# Patient Record
Sex: FEMALE | Race: ASIAN | NOT HISPANIC OR LATINO | Employment: FULL TIME | URBAN - METROPOLITAN AREA
[De-identification: names, ages, dates, MRNs, and addresses within clinical notes are randomized per-mention and may not be internally consistent; named-entity substitution may affect disease eponyms.]

---

## 2023-03-09 LAB — CHORIOGONADOTROPIN (MIU/ML) IN SER/PLAS: 98 IU/L

## 2023-03-13 LAB — CHORIOGONADOTROPIN (MIU/ML) IN SER/PLAS: 22 IU/L

## 2023-04-05 LAB
ABO GROUP (TYPE) IN BLOOD: NORMAL
ANTIBODY SCREEN: NORMAL
ANTICARDIOLIPIN IGA ANTIBODY: 0.9 APL U/ML (ref 0–20)
ANTICARDIOLIPIN IGG ANTIBODY: <1.6 GPL U/ML (ref 0–20)
ANTICARDIOLIPIN IGM ANTIBODY: 1.1 MPL U/ML (ref 0–20)
BETA 2 GLYCOPROTEIN 1 IGA AB IN SERUM: <0.6 U/ML (ref 0–20)
BETA 2 GLYCOPROTEIN 1 IGG AB IN SERUM: <1.4 U/ML (ref 0–20)
BETA 2 GLYCOPROTEIN 1 IGM ANTIBODY IN SERUM: 1 U/ML (ref 0–20)
ERYTHROCYTE DISTRIBUTION WIDTH (RATIO) BY AUTOMATED COUNT: 13.3 % (ref 11.5–14.5)
ERYTHROCYTE MEAN CORPUSCULAR HEMOGLOBIN CONCENTRATION (G/DL) BY AUTOMATED: 33.5 G/DL (ref 32–36)
ERYTHROCYTE MEAN CORPUSCULAR VOLUME (FL) BY AUTOMATED COUNT: 90 FL (ref 80–100)
ERYTHROCYTES (10*6/UL) IN BLOOD BY AUTOMATED COUNT: 5.15 X10E12/L (ref 4–5.2)
ESTIMATED AVERAGE GLUCOSE FOR HBA1C: 91 MG/DL
HEMATOCRIT (%) IN BLOOD BY AUTOMATED COUNT: 46.3 % (ref 36–46)
HEMOGLOBIN (G/DL) IN BLOOD: 15.5 G/DL (ref 12–16)
HEMOGLOBIN A1C/HEMOGLOBIN TOTAL IN BLOOD: 4.8 %
HEPATITIS B VIRUS SURFACE AG PRESENCE IN SERUM: NONREACTIVE
HEPATITIS C VIRUS AB PRESENCE IN SERUM: NONREACTIVE
HIV 1/ 2 AG/AB SCREEN: NONREACTIVE
LEUKOCYTES (10*3/UL) IN BLOOD BY AUTOMATED COUNT: 5 X10E9/L (ref 4.4–11.3)
NRBC (PER 100 WBCS) BY AUTOMATED COUNT: 0 /100 WBC (ref 0–0)
PLATELETS (10*3/UL) IN BLOOD AUTOMATED COUNT: 219 X10E9/L (ref 150–450)
PROLACTIN (UG/L) IN SER/PLAS: 7.4 UG/L (ref 3–20)
RH FACTOR: NORMAL
RUBELLA VIRUS IGG AB: POSITIVE
SYPHILIS TOTAL AB: NONREACTIVE
THYROTROPIN (MIU/L) IN SER/PLAS BY DETECTION LIMIT <= 0.05 MIU/L: 1.72 MIU/L (ref 0.44–3.98)
VARICELLA ZOSTER IGG: POSITIVE

## 2023-04-06 LAB
CHLAMYDIA TRACH., AMPLIFIED: NEGATIVE
DILUTE RUSSELL VIPER VENOM TIME CONF: 0.97 RATIO
DILUTE RUSSELL VIPER VENOM TIME SCREEN: 0.91 RATIO
DILUTE RUSSELL VIPER VENOM TIME TEST RATIO: 0.93 RATIO
LUPUS ANTICOAGULANT INTERPRETATION: NORMAL
N. GONORRHEA, AMPLIFIED: NEGATIVE
NORMALIZED SILICA CLOTTING TIME (RATIO) OF PPP: 0.83 RATIO
SILICA CLOTTING TIME CONFIRMATION: 1.32 RATIO
SILICA CLOTTING TIME SCREEN: 1.1 RATIO

## 2023-04-08 LAB — LIVER/KIDNEY MICROSOME TYPE 1 ANTIBODIES: NORMAL

## 2023-05-13 LAB — ANTI-MULLERIAN HORMONE (AMH): 10.9 NG/ML (ref 0.18–11.71)

## 2023-11-15 ENCOUNTER — APPOINTMENT (OUTPATIENT)
Dept: OBSTETRICS AND GYNECOLOGY | Facility: CLINIC | Age: 33
End: 2023-11-15
Payer: COMMERCIAL

## 2023-11-15 ENCOUNTER — LAB (OUTPATIENT)
Dept: LAB | Facility: LAB | Age: 33
End: 2023-11-15
Payer: COMMERCIAL

## 2023-11-15 ENCOUNTER — OFFICE VISIT (OUTPATIENT)
Dept: OBSTETRICS AND GYNECOLOGY | Facility: CLINIC | Age: 33
End: 2023-11-15
Payer: COMMERCIAL

## 2023-11-15 VITALS
WEIGHT: 146 LBS | DIASTOLIC BLOOD PRESSURE: 82 MMHG | HEIGHT: 61 IN | BODY MASS INDEX: 27.56 KG/M2 | SYSTOLIC BLOOD PRESSURE: 132 MMHG

## 2023-11-15 DIAGNOSIS — Z3A.11 11 WEEKS GESTATION OF PREGNANCY (HHS-HCC): ICD-10-CM

## 2023-11-15 DIAGNOSIS — O36.80X0 PREGNANCY WITH INCONCLUSIVE FETAL VIABILITY (HHS-HCC): Primary | ICD-10-CM

## 2023-11-15 DIAGNOSIS — O26.21 RECURRENT PREGNANCY LOSS IN PREGNANT PATIENT IN FIRST TRIMESTER, ANTEPARTUM (HHS-HCC): ICD-10-CM

## 2023-11-15 DIAGNOSIS — O36.80X0 PREGNANCY WITH INCONCLUSIVE FETAL VIABILITY, SINGLE OR UNSPECIFIED FETUS (HHS-HCC): ICD-10-CM

## 2023-11-15 LAB
ABO GROUP (TYPE) IN BLOOD: NORMAL
ANTIBODY SCREEN: NORMAL
RH FACTOR (ANTIGEN D): NORMAL

## 2023-11-15 PROCEDURE — 86317 IMMUNOASSAY INFECTIOUS AGENT: CPT

## 2023-11-15 PROCEDURE — 86901 BLOOD TYPING SEROLOGIC RH(D): CPT

## 2023-11-15 PROCEDURE — 1036F TOBACCO NON-USER: CPT | Performed by: OBSTETRICS & GYNECOLOGY

## 2023-11-15 PROCEDURE — 87389 HIV-1 AG W/HIV-1&-2 AB AG IA: CPT

## 2023-11-15 PROCEDURE — 86900 BLOOD TYPING SEROLOGIC ABO: CPT

## 2023-11-15 PROCEDURE — 86780 TREPONEMA PALLIDUM: CPT

## 2023-11-15 PROCEDURE — 86850 RBC ANTIBODY SCREEN: CPT

## 2023-11-15 PROCEDURE — 85027 COMPLETE CBC AUTOMATED: CPT

## 2023-11-15 PROCEDURE — 86803 HEPATITIS C AB TEST: CPT

## 2023-11-15 PROCEDURE — 36415 COLL VENOUS BLD VENIPUNCTURE: CPT

## 2023-11-15 PROCEDURE — 99214 OFFICE O/P EST MOD 30 MIN: CPT | Performed by: OBSTETRICS & GYNECOLOGY

## 2023-11-15 PROCEDURE — 76830 TRANSVAGINAL US NON-OB: CPT | Performed by: OBSTETRICS & GYNECOLOGY

## 2023-11-15 PROCEDURE — 87340 HEPATITIS B SURFACE AG IA: CPT

## 2023-11-15 ASSESSMENT — ENCOUNTER SYMPTOMS: CONSTIPATION: 1

## 2023-11-15 NOTE — PROGRESS NOTES
Subjective   Patient ID: Ephraim Diego is a 33 y.o. female who presents for Confirm pregnancy.  Established patient 33 years old.  Here with Cristino her significant other.  Third pregnancy.  2 prior miscarriages.  Has been on progesterone till 10 weeks.  She is certain that she ovulated around .  Abdominal and transvaginal ultrasound confirmed 11.4-week viable olson IUP.  Due date of Jesenia 3.  Reviewed pregnancy recommendations.  Obtain prenatal blood work.  Counseled on noninvasive prenatal testing.        Review of Systems   Gastrointestinal:  Positive for constipation.       Objective   Physical Exam pelvic exam confirms 11.4-week viable IUP    Assessment/Plan    3 para 0.  Ultrasound confirms viable 11.4-week IUP.  Due date Jesenia 3.  Obtain prenatal blood work.  Follow-up 2 weeks to repeat ultrasound in counseled on noninvasive prenatal testing

## 2023-11-16 LAB
ERYTHROCYTE [DISTWIDTH] IN BLOOD BY AUTOMATED COUNT: 12.6 % (ref 11.5–14.5)
HBV SURFACE AG SERPL QL IA: NONREACTIVE
HCT VFR BLD AUTO: 41.1 % (ref 36–46)
HCV AB SER QL: NONREACTIVE
HGB BLD-MCNC: 13.9 G/DL (ref 12–16)
HIV 1+2 AB+HIV1 P24 AG SERPL QL IA: NONREACTIVE
MCH RBC QN AUTO: 29.5 PG (ref 26–34)
MCHC RBC AUTO-ENTMCNC: 33.8 G/DL (ref 32–36)
MCV RBC AUTO: 87 FL (ref 80–100)
NRBC BLD-RTO: 0 /100 WBCS (ref 0–0)
PLATELET # BLD AUTO: 228 X10*3/UL (ref 150–450)
RBC # BLD AUTO: 4.71 X10*6/UL (ref 4–5.2)
REFLEX ADDED, ANEMIA PANEL: NORMAL
RUBV IGG SERPL IA-ACNC: 1.1 IA
RUBV IGG SERPL QL IA: POSITIVE
T PALLIDUM AB SER QL: NONREACTIVE
WBC # BLD AUTO: 7.1 X10*3/UL (ref 4.4–11.3)

## 2023-12-14 ENCOUNTER — ROUTINE PRENATAL (OUTPATIENT)
Dept: OBSTETRICS AND GYNECOLOGY | Facility: CLINIC | Age: 33
End: 2023-12-14
Payer: COMMERCIAL

## 2023-12-14 ENCOUNTER — LAB (OUTPATIENT)
Dept: LAB | Facility: LAB | Age: 33
End: 2023-12-14
Payer: COMMERCIAL

## 2023-12-14 VITALS — SYSTOLIC BLOOD PRESSURE: 100 MMHG | BODY MASS INDEX: 27.78 KG/M2 | WEIGHT: 147 LBS | DIASTOLIC BLOOD PRESSURE: 68 MMHG

## 2023-12-14 DIAGNOSIS — Z51.81 ENCOUNTER FOR THERAPEUTIC DRUG MONITORING: ICD-10-CM

## 2023-12-14 DIAGNOSIS — Z34.02 ENCOUNTER FOR SUPERVISION OF NORMAL FIRST PREGNANCY IN SECOND TRIMESTER (HHS-HCC): ICD-10-CM

## 2023-12-14 DIAGNOSIS — R30.0 DYSURIA: ICD-10-CM

## 2023-12-14 LAB
POC BLOOD, URINE: NEGATIVE
POC GLUCOSE, URINE: NEGATIVE MG/DL
POC LEUKOCYTES, URINE: NEGATIVE
POC NITRITE,URINE: NEGATIVE
POC PROTEIN, URINE: NEGATIVE MG/DL

## 2023-12-14 PROCEDURE — 81002 URINALYSIS NONAUTO W/O SCOPE: CPT | Performed by: MIDWIFE

## 2023-12-14 PROCEDURE — 87491 CHLMYD TRACH DNA AMP PROBE: CPT

## 2023-12-14 PROCEDURE — 0501F PRENATAL FLOW SHEET: CPT | Performed by: MIDWIFE

## 2023-12-14 PROCEDURE — 87591 N.GONORRHOEAE DNA AMP PROB: CPT

## 2023-12-14 PROCEDURE — 82105 ALPHA-FETOPROTEIN SERUM: CPT

## 2023-12-14 PROCEDURE — 80307 DRUG TEST PRSMV CHEM ANLYZR: CPT

## 2023-12-14 PROCEDURE — 36415 COLL VENOUS BLD VENIPUNCTURE: CPT

## 2023-12-14 PROCEDURE — 87086 URINE CULTURE/COLONY COUNT: CPT

## 2023-12-14 NOTE — PROGRESS NOTES
"Subjective   Patient ID [id number]   Ephraim Diego is a 33 y.o.  at 15w3d by US 11/15/23 done by Dr Willis giving pt EDC of 6/3/24  . who presents for a routine prenatal visit. She denies vaginal bleeding, leakage of fluid, decreased fetal movements, or contractions.    Her pregnancy is complicated by:  none    Objective   Physical Exam  Weight: 66.7 kg (147 lb)  Expected Total Weight Gain: Could not be calculated   Pregravid BMI: Could not be calculated  BP: 100/68  Fetal Heart Rate: 153      Prenatal Labs  Urine dip:  No results found for: \"KETONESU\", \"GLUCOSEUR\", \"LEUKOCYTESUR\"    Lab Results   Component Value Date    HGB 13.9 11/15/2023    HCT 41.1 11/15/2023    ABO B 11/15/2023    HEPBSAG Nonreactive 11/15/2023     No results found for: \"PAPPA\", \"AFP\", \"HCG\", \"ESTRIOL\", \"INHBA\"  No results found for: \"GLUF\", \"GLUT1\", \"SBNUQOP8JX\", \"BLMJUGE2ID\"    Imaging  The most recent ultrasound was performed on   with a study GA of   and EFW of  .          Assessment/Plan   Diagnoses and all orders for this visit:  Dysuria  -     Urine Culture  Encounter for supervision of normal first pregnancy in second trimester  -     POCT UA (nonautomated) manually resulted  -     AFP, Maternal Serum; Future  -     C. Trachomatis / N. Gonorrhoeae, Amplified Detection  Encounter for therapeutic drug monitoring  -     Drug Screen, Urine With Reflex to Confirmation      Continue prenatal vitamin.  Labs reviewed.      AFP ordered. We reviewed warning s/sx.  Follow up in 4 weeks for a routine prenatal visit.  "

## 2023-12-15 ENCOUNTER — LAB REQUISITION (OUTPATIENT)
Dept: LAB | Facility: HOSPITAL | Age: 33
End: 2023-12-15
Payer: COMMERCIAL

## 2023-12-15 DIAGNOSIS — Z34.02 ENCOUNTER FOR SUPERVISION OF NORMAL FIRST PREGNANCY, SECOND TRIMESTER (HHS-HCC): ICD-10-CM

## 2023-12-15 DIAGNOSIS — Z51.81 ENCOUNTER FOR THERAPEUTIC DRUG LEVEL MONITORING: ICD-10-CM

## 2023-12-15 DIAGNOSIS — R30.0 DYSURIA: ICD-10-CM

## 2023-12-15 LAB
AMPHETAMINES UR QL SCN: NORMAL
BARBITURATES UR QL SCN: NORMAL
BENZODIAZ UR QL SCN: NORMAL
BZE UR QL SCN: NORMAL
C TRACH RRNA SPEC QL NAA+PROBE: NEGATIVE
CANNABINOIDS UR QL SCN: NORMAL
FENTANYL+NORFENTANYL UR QL SCN: NORMAL
N GONORRHOEA DNA SPEC QL PROBE+SIG AMP: NEGATIVE
OPIATES UR QL SCN: NORMAL
OXYCODONE+OXYMORPHONE UR QL SCN: NORMAL
PCP UR QL SCN: NORMAL

## 2023-12-16 LAB — BACTERIA UR CULT: NORMAL

## 2023-12-18 LAB
AFP INTERP SERPL-IMP: NORMAL
AFP INTERP SERPL-IMP: NORMAL
AFP MOM SERPL: 0.92
AFP SERPL-MCNC: 29 NG/ML
AGE AT DELIVERY: 32.7 YR
COMMENT,AFP MATERNAL SERUM: NORMAL
GA METHOD: NORMAL
GA: 15.4 WEEKS
IDDM PATIENT QL: NO
MULTIPLE PREGNANCY: NO
NEURAL TUBE DEFECT RISK FETUS: NORMAL %
RESULTS, AFP MATERNAL SERUM: NORMAL

## 2024-01-10 ENCOUNTER — ROUTINE PRENATAL (OUTPATIENT)
Dept: OBSTETRICS AND GYNECOLOGY | Facility: CLINIC | Age: 34
End: 2024-01-10
Payer: COMMERCIAL

## 2024-01-10 VITALS — DIASTOLIC BLOOD PRESSURE: 68 MMHG | SYSTOLIC BLOOD PRESSURE: 116 MMHG | WEIGHT: 151 LBS | BODY MASS INDEX: 28.53 KG/M2

## 2024-01-10 DIAGNOSIS — Z3A.20 20 WEEKS GESTATION OF PREGNANCY (HHS-HCC): ICD-10-CM

## 2024-01-10 DIAGNOSIS — Z3A.19 19 WEEKS GESTATION OF PREGNANCY (HHS-HCC): ICD-10-CM

## 2024-01-10 PROCEDURE — 0501F PRENATAL FLOW SHEET: CPT | Performed by: OBSTETRICS & GYNECOLOGY

## 2024-01-10 PROCEDURE — 76816 OB US FOLLOW-UP PER FETUS: CPT | Performed by: OBSTETRICS & GYNECOLOGY

## 2024-01-10 PROCEDURE — 81002 URINALYSIS NONAUTO W/O SCOPE: CPT | Performed by: OBSTETRICS & GYNECOLOGY

## 2024-01-10 NOTE — PROGRESS NOTES
"Subjective   Patient ID [id number]   Ephraim Diego is a 33 y.o.  at Unknown with a working estimated date of delivery of Not found. who presents for a routine prenatal visit. She denies vaginal bleeding, leakage of fluid, decreased fetal movements, or contractions.    Her pregnancy is complicated by:  Uncomplicated    Objective   Physical Exam  Weight: 68.5 kg (151 lb)  Expected Total Weight Gain: Could not be calculated   Pregravid BMI: Could not be calculated  BP: 116/68         Prenatal Labs  Urine dip:  No results found for: \"KETONESU\", \"GLUCOSEUR\", \"LEUKOCYTESUR\"    Lab Results   Component Value Date    HGB 13.9 11/15/2023    HCT 41.1 11/15/2023    ABO B 11/15/2023    HEPBSAG Nonreactive 11/15/2023     No results found for: \"PAPPA\", \"AFP\", \"HCG\", \"ESTRIOL\", \"INHBA\"  No results found for: \"GLUF\", \"GLUT1\", \"PHZEVYL7GI\", \"KMBAFZO2OO\"    Imaging  The most recent ultrasound was performed on   with a study GA of   and EFW of  .          Assessment/Plan       Continue prenatal vitamin.  Labs reviewed.  Rhogam not indicated  GTT 24-28.  Anatomy scan next week  Follow up in 4 weeks for a routine prenatal visit.  "

## 2024-01-17 ENCOUNTER — ANCILLARY PROCEDURE (OUTPATIENT)
Dept: RADIOLOGY | Facility: CLINIC | Age: 34
End: 2024-01-17
Payer: COMMERCIAL

## 2024-01-17 DIAGNOSIS — Z3A.20 20 WEEKS GESTATION OF PREGNANCY (HHS-HCC): ICD-10-CM

## 2024-01-17 PROCEDURE — 76805 OB US >/= 14 WKS SNGL FETUS: CPT

## 2024-01-17 PROCEDURE — 76805 OB US >/= 14 WKS SNGL FETUS: CPT | Performed by: STUDENT IN AN ORGANIZED HEALTH CARE EDUCATION/TRAINING PROGRAM

## 2024-02-01 ENCOUNTER — HOSPITAL ENCOUNTER (OUTPATIENT)
Dept: RADIOLOGY | Facility: CLINIC | Age: 34
Discharge: HOME | End: 2024-02-01
Payer: COMMERCIAL

## 2024-02-01 DIAGNOSIS — Z34.90 PREGNANT (HHS-HCC): ICD-10-CM

## 2024-02-01 PROCEDURE — 76815 OB US LIMITED FETUS(S): CPT

## 2024-02-01 PROCEDURE — 76815 OB US LIMITED FETUS(S): CPT | Performed by: STUDENT IN AN ORGANIZED HEALTH CARE EDUCATION/TRAINING PROGRAM

## 2024-02-08 ENCOUNTER — ROUTINE PRENATAL (OUTPATIENT)
Dept: OBSTETRICS AND GYNECOLOGY | Facility: CLINIC | Age: 34
End: 2024-02-08
Payer: COMMERCIAL

## 2024-02-08 VITALS — WEIGHT: 153 LBS | BODY MASS INDEX: 28.91 KG/M2 | DIASTOLIC BLOOD PRESSURE: 62 MMHG | SYSTOLIC BLOOD PRESSURE: 118 MMHG

## 2024-02-08 DIAGNOSIS — Z34.82 ENCOUNTER FOR SUPERVISION OF OTHER NORMAL PREGNANCY, SECOND TRIMESTER (HHS-HCC): ICD-10-CM

## 2024-02-08 PROCEDURE — 0501F PRENATAL FLOW SHEET: CPT | Performed by: MIDWIFE

## 2024-02-08 NOTE — PROGRESS NOTES
"Subjective   Patient ID [id number]   Ephraim Diego is a 33 y.o.  at 23w5d with a working estimated date of delivery of 2024, by Ultrasound who presents for a routine prenatal visit. She denies vaginal bleeding, leakage of fluid, decreased fetal movements, or contractions.  Pt has started doing Pilates again!    Her pregnancy is complicated by:  none    Objective   Physical Exam  Weight: 69.4 kg (153 lb)  Expected Total Weight Gain: Could not be calculated   Pregravid BMI: Could not be calculated  BP: 118/62  Fetal Heart Rate: 146 Fundal Height (cm): 24 cm    Prenatal Labs  Urine dip:  No results found for: \"KETONESU\", \"GLUCOSEUR\", \"LEUKOCYTESUR\"    Lab Results   Component Value Date    HGB 13.9 11/15/2023    HCT 41.1 11/15/2023    ABO B 11/15/2023    HEPBSAG Nonreactive 11/15/2023     No results found for: \"PAPPA\", \"AFP\", \"HCG\", \"ESTRIOL\", \"INHBA\"  No results found for: \"GLUF\", \"GLUT1\", \"TGKARNY4IT\", \"WSOKSPC6PX\"    Imaging  The most recent ultrasound was performed on   with a study GA of   and EFW of  .          Assessment/Plan   Diagnoses and all orders for this visit:  Encounter for supervision of other normal pregnancy, second trimester  -     Hemoglobin and Hematocrit, Blood; Future  -     Glucose, 1 Hour Screen, Pregnancy; Future  -     POCT UA (nonautomated) manually resulted      Continue prenatal vitamin.  Labs reviewed.    GTT ordered.  Importance of daily FM, warning s/sx reviewed  Follow up in 4 weeks for a routine prenatal visit.  "

## 2024-02-22 ENCOUNTER — LAB (OUTPATIENT)
Dept: LAB | Facility: LAB | Age: 34
End: 2024-02-22
Payer: COMMERCIAL

## 2024-02-22 DIAGNOSIS — Z34.82 ENCOUNTER FOR SUPERVISION OF OTHER NORMAL PREGNANCY, SECOND TRIMESTER (HHS-HCC): ICD-10-CM

## 2024-02-22 LAB
GLUCOSE 1H P 50 G GLC PO SERPL-MCNC: 125 MG/DL
HCT VFR BLD AUTO: 39.3 % (ref 36–46)
HGB BLD-MCNC: 13 G/DL (ref 12–16)

## 2024-02-22 PROCEDURE — 85014 HEMATOCRIT: CPT

## 2024-02-22 PROCEDURE — 85018 HEMOGLOBIN: CPT

## 2024-02-22 PROCEDURE — 36415 COLL VENOUS BLD VENIPUNCTURE: CPT

## 2024-02-22 PROCEDURE — 82947 ASSAY GLUCOSE BLOOD QUANT: CPT

## 2024-03-06 ENCOUNTER — APPOINTMENT (OUTPATIENT)
Dept: PEDIATRICS | Facility: CLINIC | Age: 34
End: 2024-03-06
Payer: COMMERCIAL

## 2024-03-08 ENCOUNTER — ROUTINE PRENATAL (OUTPATIENT)
Dept: OBSTETRICS AND GYNECOLOGY | Facility: CLINIC | Age: 34
End: 2024-03-08
Payer: COMMERCIAL

## 2024-03-08 ENCOUNTER — PHARMACY VISIT (OUTPATIENT)
Dept: PHARMACY | Facility: CLINIC | Age: 34
End: 2024-03-08
Payer: MEDICARE

## 2024-03-08 VITALS — WEIGHT: 155 LBS | BODY MASS INDEX: 29.29 KG/M2 | SYSTOLIC BLOOD PRESSURE: 120 MMHG | DIASTOLIC BLOOD PRESSURE: 78 MMHG

## 2024-03-08 DIAGNOSIS — Z3A.27 27 WEEKS GESTATION OF PREGNANCY (HHS-HCC): ICD-10-CM

## 2024-03-08 LAB
POC BLOOD, URINE: NEGATIVE
POC GLUCOSE, URINE: ABNORMAL MG/DL
POC LEUKOCYTES, URINE: NEGATIVE
POC NITRITE,URINE: NEGATIVE
POC PROTEIN, URINE: NEGATIVE MG/DL

## 2024-03-08 PROCEDURE — 0501F PRENATAL FLOW SHEET: CPT | Performed by: OBSTETRICS & GYNECOLOGY

## 2024-03-08 PROCEDURE — RXMED WILLOW AMBULATORY MEDICATION CHARGE

## 2024-03-08 RX ORDER — TETANUS TOXOID, REDUCED DIPHTHERIA TOXOID AND ACELLULAR PERTUSSIS VACCINE, ADSORBED 5; 2.5; 8; 8; 2.5 [IU]/.5ML; [IU]/.5ML; UG/.5ML; UG/.5ML; UG/.5ML
SUSPENSION INTRAMUSCULAR
Qty: 0.5 ML | Refills: 0 | OUTPATIENT
Start: 2024-03-08 | End: 2024-04-03 | Stop reason: ALTCHOICE

## 2024-03-08 NOTE — PROGRESS NOTES
"Subjective   Patient ID [id number]   Ephraim Diego is a 33 y.o.  at 27w6d with a working estimated date of delivery of 2024, by Ultrasound who presents for a routine prenatal visit. She denies vaginal bleeding, leakage of fluid, decreased fetal movements, or contractions.    Her pregnancy is complicated by:  Uncomplicated.  Normal Glucola.  Normal anatomy scan.  Reminder for Tdap vaccine    Objective   Physical Exam:   Weight: 70.3 kg (155 lb)  Expected Total Weight Gain: Could not be calculated   Pregravid BMI: Could not be calculated  BP: 120/78                  Prenatal Labs  Urine Dip:  No results found for: \"KETONESU\", \"GLUCOSEUR\", \"LEUKOCYTESUR\"  Lab Results   Component Value Date    HGB 13.0 2024    HCT 39.3 2024    ABO B 11/15/2023    HEPBSAG Nonreactive 11/15/2023     No results found for: \"PAPPA\", \"AFP\", \"HCG\", \"ESTRIOL\", \"INHBA\"  No results found for: \"GLUF\", \"GLUT1\", \"JCCCHDV5MR\", \"ISOIWVE8AA\"    Imaging  The most recent ultrasound was performed on   with a study GA of   and EFW of  .          Assessment/Plan   Routine prenatal care  Continue prenatal vitamin.  Labs reviewed.  GBS taken.  36 weeks vaginal  Expected mode of delivery vaginal  Follow up in 2 week for a routine prenatal visit.  "

## 2024-03-21 ENCOUNTER — ROUTINE PRENATAL (OUTPATIENT)
Dept: OBSTETRICS AND GYNECOLOGY | Facility: CLINIC | Age: 34
End: 2024-03-21
Payer: COMMERCIAL

## 2024-03-21 VITALS — WEIGHT: 157.4 LBS | DIASTOLIC BLOOD PRESSURE: 60 MMHG | SYSTOLIC BLOOD PRESSURE: 102 MMHG | BODY MASS INDEX: 29.74 KG/M2

## 2024-03-21 DIAGNOSIS — R82.998 LEUKOCYTES IN URINE: ICD-10-CM

## 2024-03-21 DIAGNOSIS — Z3A.29 29 WEEKS GESTATION OF PREGNANCY (HHS-HCC): ICD-10-CM

## 2024-03-21 LAB
POC BLOOD, URINE: NEGATIVE
POC GLUCOSE, URINE: ABNORMAL MG/DL
POC LEUKOCYTES, URINE: ABNORMAL
POC NITRITE,URINE: NEGATIVE
POC PROTEIN, URINE: ABNORMAL MG/DL

## 2024-03-21 PROCEDURE — 0501F PRENATAL FLOW SHEET: CPT | Performed by: MIDWIFE

## 2024-03-21 PROCEDURE — 87086 URINE CULTURE/COLONY COUNT: CPT

## 2024-03-21 NOTE — PROGRESS NOTES
"Subjective   Patient ID [id number]   Ephraim Diego is a 33 y.o.  at 29w5d with a working estimated date of delivery of 2024, by Ultrasound who presents for a routine prenatal visit. She denies vaginal bleeding, leakage of fluid, decreased fetal movements, or contractions.    Her pregnancy is complicated by:  none    Objective   Physical Exam:   Weight: 71.4 kg (157 lb 6.4 oz)  Expected Total Weight Gain: Could not be calculated   Pregravid BMI: Could not be calculated  BP: 102/60  Fetal Heart Rate: 143 Fundal Height (cm): 29 cm Presentation: Vertex           Prenatal Labs  Urine Dip:  No results found for: \"KETONESU\", \"GLUCOSEUR\", \"LEUKOCYTESUR\"  Lab Results   Component Value Date    HGB 13.0 2024    HCT 39.3 2024    ABO B 11/15/2023    HEPBSAG Nonreactive 11/15/2023     No results found for: \"PAPPA\", \"AFP\", \"HCG\", \"ESTRIOL\", \"INHBA\"  No results found for: \"GLUF\", \"GLUT1\", \"UVBNZRJ9PM\", \"SSOCINL9YP\"    Imaging  The most recent ultrasound was performed on   with a study GA of   and EFW of  .          Assessment/Plan   Diagnoses and all orders for this visit:  29 weeks gestation of pregnancy  -     POCT UA Automated manually resulted  Leukocytes in urine  -     Urine Culture    Continue prenatal vitamin.  Labs reviewed.    Expected mode of delivery vaginal  Importance of daily FM,  warning s/sx reviewed  Follow up in 2 week for a routine prenatal visit.  "

## 2024-03-23 LAB — BACTERIA UR CULT: NORMAL

## 2024-04-03 ENCOUNTER — ROUTINE PRENATAL (OUTPATIENT)
Dept: OBSTETRICS AND GYNECOLOGY | Facility: CLINIC | Age: 34
End: 2024-04-03
Payer: COMMERCIAL

## 2024-04-03 VITALS — SYSTOLIC BLOOD PRESSURE: 116 MMHG | BODY MASS INDEX: 30.04 KG/M2 | WEIGHT: 159 LBS | DIASTOLIC BLOOD PRESSURE: 80 MMHG

## 2024-04-03 DIAGNOSIS — Z3A.31 31 WEEKS GESTATION OF PREGNANCY (HHS-HCC): ICD-10-CM

## 2024-04-03 DIAGNOSIS — Z36.89 DETERMINE FETAL PRESENTATION USING ULTRASOUND (HHS-HCC): Primary | ICD-10-CM

## 2024-04-03 DIAGNOSIS — O99.210 OBESITY IN PREGNANCY (HHS-HCC): ICD-10-CM

## 2024-04-03 PROCEDURE — 76816 OB US FOLLOW-UP PER FETUS: CPT | Performed by: OBSTETRICS & GYNECOLOGY

## 2024-04-03 PROCEDURE — 0501F PRENATAL FLOW SHEET: CPT | Performed by: OBSTETRICS & GYNECOLOGY

## 2024-04-03 NOTE — PROGRESS NOTES
"Subjective   Patient ID [id number]   Ephraim Diego is a 33 y.o.  at 31w4d with a working estimated date of delivery of 2024, by Ultrasound who presents for a routine prenatal visit. She denies vaginal bleeding, leakage of fluid, decreased fetal movements, or contractions.    Her pregnancy is complicated by:  MI greater than 30.  Ultrasound performed today.  Weekly NST 34 weeks.  GBS 36 weeks.  Importance of daily fetal movement.  Reminder for Tdap vaccine.  Meds and symptoms of labor    Objective   Physical Exam:   Weight: 72.1 kg (159 lb)  Expected Total Weight Gain: Could not be calculated   Pregravid BMI: Could not be calculated  BP: 116/80                  Prenatal Labs  Urine Dip:  No results found for: \"KETONESU\", \"GLUCOSEUR\", \"LEUKOCYTESUR\"  Lab Results   Component Value Date    HGB 13.0 2024    HCT 39.3 2024    ABO B 11/15/2023    HEPBSAG Nonreactive 11/15/2023     No results found for: \"PAPPA\", \"AFP\", \"HCG\", \"ESTRIOL\", \"INHBA\"  No results found for: \"GLUF\", \"GLUT1\", \"VNENZWL7RI\", \"ZDZDKJO5RC\"    Imaging  The most recent ultrasound was performed on The most recent ultrasound study is not finalized with a study GA of The most recent ultrasound study is not finalized and EFW of The most recent ultrasound study is not finalized.  The most recent ultrasound study is not finalized  The most recent ultrasound study is not finalized    Assessment/Plan     Continue prenatal vitamin.  Labs reviewed.  GBS taken.  36 weeks  Expected mode of delivery vaginal  Follow up in 1 week for a routine prenatal visit.  "

## 2024-04-24 ENCOUNTER — ROUTINE PRENATAL (OUTPATIENT)
Dept: OBSTETRICS AND GYNECOLOGY | Facility: CLINIC | Age: 34
End: 2024-04-24
Payer: COMMERCIAL

## 2024-04-24 VITALS — DIASTOLIC BLOOD PRESSURE: 76 MMHG | BODY MASS INDEX: 30.61 KG/M2 | SYSTOLIC BLOOD PRESSURE: 114 MMHG | WEIGHT: 162 LBS

## 2024-04-24 DIAGNOSIS — O99.210 OBESITY IN PREGNANCY (HHS-HCC): ICD-10-CM

## 2024-04-24 DIAGNOSIS — Z3A.34 34 WEEKS GESTATION OF PREGNANCY (HHS-HCC): ICD-10-CM

## 2024-04-24 PROCEDURE — 59025 FETAL NON-STRESS TEST: CPT | Performed by: OBSTETRICS & GYNECOLOGY

## 2024-04-24 PROCEDURE — 0501F PRENATAL FLOW SHEET: CPT | Performed by: OBSTETRICS & GYNECOLOGY

## 2024-04-24 NOTE — PROGRESS NOTES
"Subjective   Patient ID [id number]   Ephraim Diego is a 33 y.o.  at 34w4d with a working estimated date of delivery of 2024, by Ultrasound who presents for a routine prenatal visit. She denies vaginal bleeding, leakage of fluid, decreased fetal movements, or contractions.    Her pregnancy is complicated by:  BMI greater than 30.  Weekly NST.  NST reactive.  Reminder for Tdap vaccination.  Signs and symptoms of labor.  GBS 36 weeks    Objective   Physical Exam:   Weight: 73.5 kg (162 lb)  Expected Total Weight Gain: Could not be calculated   Pregravid BMI: Could not be calculated  BP: 114/76                  Prenatal Labs  Urine Dip:  No results found for: \"KETONESU\", \"GLUCOSEUR\", \"LEUKOCYTESUR\"  Lab Results   Component Value Date    HGB 13.0 2024    HCT 39.3 2024    ABO B 11/15/2023    HEPBSAG Nonreactive 11/15/2023     No results found for: \"PAPPA\", \"AFP\", \"HCG\", \"ESTRIOL\", \"INHBA\"  No results found for: \"GLUF\", \"GLUT1\", \"OKWLUSL0BA\", \"ALIBAHM8GE\"    Imaging  The most recent ultrasound was performed on   with a study GA of   and EFW of  .          Assessment/Plan   Continue weekly NST  Continue prenatal vitamin.  Labs reviewed.  GBS taken.  36 weeks  Expected mode of delivery vaginal  Follow up in 1 week for a routine prenatal visit.  "

## 2024-05-02 ENCOUNTER — ROUTINE PRENATAL (OUTPATIENT)
Dept: OBSTETRICS AND GYNECOLOGY | Facility: CLINIC | Age: 34
End: 2024-05-02
Payer: COMMERCIAL

## 2024-05-02 VITALS — BODY MASS INDEX: 30.8 KG/M2 | WEIGHT: 163 LBS | SYSTOLIC BLOOD PRESSURE: 118 MMHG | DIASTOLIC BLOOD PRESSURE: 64 MMHG

## 2024-05-02 DIAGNOSIS — R82.998 LEUKOCYTES IN URINE: ICD-10-CM

## 2024-05-02 DIAGNOSIS — Z36.89 NST (NON-STRESS TEST) REACTIVE (HHS-HCC): ICD-10-CM

## 2024-05-02 DIAGNOSIS — Z3A.35 35 WEEKS GESTATION OF PREGNANCY (HHS-HCC): ICD-10-CM

## 2024-05-02 LAB
POC BLOOD, URINE: NEGATIVE
POC GLUCOSE, URINE: NEGATIVE MG/DL
POC LEUKOCYTES, URINE: ABNORMAL
POC NITRITE,URINE: NEGATIVE
POC PROTEIN, URINE: ABNORMAL MG/DL

## 2024-05-02 PROCEDURE — 81003 URINALYSIS AUTO W/O SCOPE: CPT | Performed by: MIDWIFE

## 2024-05-02 PROCEDURE — 59025 FETAL NON-STRESS TEST: CPT | Performed by: MIDWIFE

## 2024-05-02 PROCEDURE — 0501F PRENATAL FLOW SHEET: CPT | Performed by: MIDWIFE

## 2024-05-02 PROCEDURE — 87086 URINE CULTURE/COLONY COUNT: CPT

## 2024-05-02 NOTE — PROCEDURES
Ephraim Diego, a  at 35w5d with an KYLE of 2024, by Ultrasound, was seen at [facility] for a nonstress test.    Non-Stress Test   Baseline Fetal Heart Rate for Non-Stress Test: 135 BPM  Variability in Waveform for Non-Stress Test: Moderate  Accelerations in Non-Stress Test: greater than/equal to 15 bpm, lasting at least 15 seconds, Yes  Decelerations in Non-Stress Test: None  Interpretation of Non-Stress Test   Interpretation of Non-Stress Test: Reactive

## 2024-05-02 NOTE — PROGRESS NOTES
"Subjective   Patient ID [id number]   Ephraim Diego is a 33 y.o.  at 35w5d with a working estimated date of delivery of 2024, by Ultrasound who presents for a routine prenatal visit. She denies vaginal bleeding, leakage of fluid, decreased fetal movements, or contractions.    Her pregnancy is complicated by:  BMI >30    Objective   Physical Exam:   Weight: 73.9 kg (163 lb)  Expected Total Weight Gain: Could not be calculated   Pregravid BMI: Could not be calculated  BP: 118/64  Fetal Heart Rate: NST Fundal Height (cm): 35 cm Presentation: Vertex           Prenatal Labs  Urine Dip:  No results found for: \"KETONESU\", \"GLUCOSEUR\", \"LEUKOCYTESUR\"  Lab Results   Component Value Date    HGB 13.0 2024    HCT 39.3 2024    ABO B 11/15/2023    HEPBSAG Nonreactive 11/15/2023     No results found for: \"PAPPA\", \"AFP\", \"HCG\", \"ESTRIOL\", \"INHBA\"  No results found for: \"GLUF\", \"GLUT1\", \"KDRMTZI6VX\", \"QHCBZMM8XQ\"    Imaging  The most recent ultrasound was performed on   with a study GA of   and EFW of  .          Assessment/Plan   Diagnoses and all orders for this visit:  Obesity complicating childbirth ([facility])  35 weeks gestation of pregnancy ([facility])  -     POCT UA Automated manually resulted  -     Fetal nonstress test  Leukocytes in urine  -     Urine Culture  NST (non-stress test) reactive ([facility])    Continue prenatal vitamin.  Labs reviewed.    Expected mode of delivery vaginal  Importance of daily Fetal Movement awareness, labor and warning s/sx reviewed    Follow up in 1 week for a routine prenatal visit.  "

## 2024-05-03 LAB — BACTERIA UR CULT: NORMAL

## 2024-05-10 ENCOUNTER — ROUTINE PRENATAL (OUTPATIENT)
Dept: OBSTETRICS AND GYNECOLOGY | Facility: CLINIC | Age: 34
End: 2024-05-10
Payer: COMMERCIAL

## 2024-05-10 ENCOUNTER — PREP FOR PROCEDURE (OUTPATIENT)
Dept: OBSTETRICS AND GYNECOLOGY | Facility: HOSPITAL | Age: 34
End: 2024-05-10

## 2024-05-10 VITALS — WEIGHT: 165 LBS | SYSTOLIC BLOOD PRESSURE: 122 MMHG | DIASTOLIC BLOOD PRESSURE: 76 MMHG | BODY MASS INDEX: 31.18 KG/M2

## 2024-05-10 DIAGNOSIS — O99.210 OBESITY IN PREGNANCY (HHS-HCC): ICD-10-CM

## 2024-05-10 DIAGNOSIS — Z34.90 TERM PREGNANCY (HHS-HCC): Primary | ICD-10-CM

## 2024-05-10 DIAGNOSIS — Z3A.36 36 WEEKS GESTATION OF PREGNANCY (HHS-HCC): ICD-10-CM

## 2024-05-10 PROCEDURE — 87081 CULTURE SCREEN ONLY: CPT

## 2024-05-10 PROCEDURE — 59025 FETAL NON-STRESS TEST: CPT | Performed by: OBSTETRICS & GYNECOLOGY

## 2024-05-10 PROCEDURE — 0501F PRENATAL FLOW SHEET: CPT | Performed by: OBSTETRICS & GYNECOLOGY

## 2024-05-10 RX ORDER — LABETALOL HYDROCHLORIDE 5 MG/ML
20 INJECTION, SOLUTION INTRAVENOUS ONCE AS NEEDED
Status: CANCELLED | OUTPATIENT
Start: 2024-05-10

## 2024-05-10 RX ORDER — METOCLOPRAMIDE HYDROCHLORIDE 5 MG/ML
10 INJECTION INTRAMUSCULAR; INTRAVENOUS EVERY 6 HOURS PRN
Status: CANCELLED | OUTPATIENT
Start: 2024-05-10

## 2024-05-10 RX ORDER — LOPERAMIDE HYDROCHLORIDE 2 MG/1
4 CAPSULE ORAL EVERY 2 HOUR PRN
Status: CANCELLED | OUTPATIENT
Start: 2024-05-10

## 2024-05-10 RX ORDER — METHYLERGONOVINE MALEATE 0.2 MG/ML
0.2 INJECTION INTRAVENOUS ONCE AS NEEDED
Status: CANCELLED | OUTPATIENT
Start: 2024-05-10

## 2024-05-10 RX ORDER — ONDANSETRON HYDROCHLORIDE 2 MG/ML
4 INJECTION, SOLUTION INTRAVENOUS EVERY 6 HOURS PRN
Status: CANCELLED | OUTPATIENT
Start: 2024-05-10

## 2024-05-10 RX ORDER — OXYTOCIN 10 [USP'U]/ML
10 INJECTION, SOLUTION INTRAMUSCULAR; INTRAVENOUS ONCE AS NEEDED
Status: CANCELLED | OUTPATIENT
Start: 2024-05-10

## 2024-05-10 RX ORDER — TRANEXAMIC ACID 100 MG/ML
1000 INJECTION, SOLUTION INTRAVENOUS ONCE AS NEEDED
Status: CANCELLED | OUTPATIENT
Start: 2024-05-10

## 2024-05-10 RX ORDER — SODIUM CHLORIDE, SODIUM LACTATE, POTASSIUM CHLORIDE, CALCIUM CHLORIDE 600; 310; 30; 20 MG/100ML; MG/100ML; MG/100ML; MG/100ML
125 INJECTION, SOLUTION INTRAVENOUS CONTINUOUS
Status: CANCELLED | OUTPATIENT
Start: 2024-05-10

## 2024-05-10 RX ORDER — CARBOPROST TROMETHAMINE 250 UG/ML
250 INJECTION, SOLUTION INTRAMUSCULAR ONCE AS NEEDED
Status: CANCELLED | OUTPATIENT
Start: 2024-05-10

## 2024-05-10 RX ORDER — METOCLOPRAMIDE 5 MG/1
10 TABLET ORAL EVERY 6 HOURS PRN
Status: CANCELLED | OUTPATIENT
Start: 2024-05-10

## 2024-05-10 RX ORDER — ENOXAPARIN SODIUM 300 MG/3ML
40 INJECTION INTRAVENOUS; SUBCUTANEOUS EVERY 24 HOURS
Status: CANCELLED | OUTPATIENT
Start: 2024-05-10

## 2024-05-10 RX ORDER — NIFEDIPINE 10 MG/1
10 CAPSULE ORAL ONCE AS NEEDED
Status: CANCELLED | OUTPATIENT
Start: 2024-05-10

## 2024-05-10 RX ORDER — HYDRALAZINE HYDROCHLORIDE 20 MG/ML
5 INJECTION INTRAMUSCULAR; INTRAVENOUS ONCE AS NEEDED
Status: CANCELLED | OUTPATIENT
Start: 2024-05-10

## 2024-05-10 RX ORDER — TERBUTALINE SULFATE 1 MG/ML
0.25 INJECTION SUBCUTANEOUS ONCE AS NEEDED
Status: CANCELLED | OUTPATIENT
Start: 2024-05-10

## 2024-05-10 RX ORDER — ONDANSETRON 4 MG/1
4 TABLET, FILM COATED ORAL EVERY 6 HOURS PRN
Status: CANCELLED | OUTPATIENT
Start: 2024-05-10

## 2024-05-10 RX ORDER — MISOPROSTOL 100 UG/1
800 TABLET ORAL ONCE AS NEEDED
Status: CANCELLED | OUTPATIENT
Start: 2024-05-10

## 2024-05-10 RX ORDER — LIDOCAINE HYDROCHLORIDE 10 MG/ML
30 INJECTION INFILTRATION; PERINEURAL ONCE AS NEEDED
Status: CANCELLED | OUTPATIENT
Start: 2024-05-10

## 2024-05-10 NOTE — ADDENDUM NOTE
Addended by: IVON GALLARDO on: 5/10/2024 10:49 AM     Modules accepted: Orders    
Yes
no chest pain, no cough, and no shortness of breath.

## 2024-05-10 NOTE — PROGRESS NOTES
"Subjective   Patient ID [id number]   Ephraim Diego is a 33 y.o.  at 36w6d with a working estimated date of delivery of 2024, by Ultrasound who presents for a routine prenatal visit. She denies vaginal bleeding, leakage of fluid, decreased fetal movements, or contractions.    Her pregnancy is complicated by:  BMI greater than 30.  Nonstress test reactive.  GBS obtained.  Importance of daily fetal movement.  Signs and symptoms of labor.  Reviewed options for elective induction after 39 weeks    Objective   Physical Exam:   Weight: 74.8 kg (165 lb)  Expected Total Weight Gain: Could not be calculated   Pregravid BMI: Could not be calculated  BP: 122/76                  Prenatal Labs  Urine Dip:  No results found for: \"KETONESU\", \"GLUCOSEUR\", \"LEUKOCYTESUR\"  Lab Results   Component Value Date    HGB 13.0 2024    HCT 39.3 2024    ABO B 11/15/2023    HEPBSAG Nonreactive 11/15/2023     No results found for: \"PAPPA\", \"AFP\", \"HCG\", \"ESTRIOL\", \"INHBA\"  No results found for: \"GLUF\", \"GLUT1\", \"GJKYBQI3CY\", \"AKSGAPX6VH\"    Imaging  The most recent ultrasound was performed on   with a study GA of   and EFW of  .          Assessment/Plan   Induction induction of labor scheduled for Wednesday, May 29 at noon.  Will review induction as we get closer.  Importance of daily fetal movement.  Signs and symptoms of labor.  GBS obtained.  Continue weekly NST  Continue prenatal vitamin.  Labs reviewed.  GBS taken.  Expected mode of delivery vaginal  Follow up in 1 week for a routine prenatal visit.  "

## 2024-05-13 LAB — GP B STREP GENITAL QL CULT: NORMAL

## 2024-05-16 ENCOUNTER — ROUTINE PRENATAL (OUTPATIENT)
Dept: OBSTETRICS AND GYNECOLOGY | Facility: CLINIC | Age: 34
End: 2024-05-16
Payer: COMMERCIAL

## 2024-05-16 VITALS — BODY MASS INDEX: 31.18 KG/M2 | SYSTOLIC BLOOD PRESSURE: 118 MMHG | WEIGHT: 165 LBS | DIASTOLIC BLOOD PRESSURE: 64 MMHG

## 2024-05-16 DIAGNOSIS — Z3A.37 37 WEEKS GESTATION OF PREGNANCY (HHS-HCC): ICD-10-CM

## 2024-05-16 DIAGNOSIS — R82.998 LEUKOCYTES IN URINE: ICD-10-CM

## 2024-05-16 DIAGNOSIS — Z36.89 NST (NON-STRESS TEST) REACTIVE ON FETAL SURVEILLANCE (HHS-HCC): ICD-10-CM

## 2024-05-16 DIAGNOSIS — O99.210 OBESITY IN PREGNANCY (HHS-HCC): Primary | ICD-10-CM

## 2024-05-16 PROCEDURE — 0501F PRENATAL FLOW SHEET: CPT | Performed by: MIDWIFE

## 2024-05-16 PROCEDURE — 81003 URINALYSIS AUTO W/O SCOPE: CPT | Performed by: MIDWIFE

## 2024-05-16 PROCEDURE — 59025 FETAL NON-STRESS TEST: CPT | Performed by: MIDWIFE

## 2024-05-16 PROCEDURE — 87086 URINE CULTURE/COLONY COUNT: CPT

## 2024-05-16 NOTE — PROCEDURES
Ephraim Diego, a  at 37w5d with an KYLE of 2024, by Ultrasound, was seen at [facility] for a nonstress test.    Non-Stress Test   Baseline Fetal Heart Rate for Non-Stress Test: 130 BPM  Variability in Waveform for Non-Stress Test: Moderate  Accelerations in Non-Stress Test: Yes, greater than/equal to 15 bpm, lasting at least 15 seconds  Decelerations in Non-Stress Test: None  Interpretation of Non-Stress Test   Interpretation of Non-Stress Test: Reactive

## 2024-05-16 NOTE — PROGRESS NOTES
"Subjective   Patient ID [id number]   Ephraim Diego is a 33 y.o.  at 37w5d with a working estimated date of delivery of 2024, by Ultrasound who presents for a routine prenatal visit. She denies vaginal bleeding, leakage of fluid, decreased fetal movements, or contractions.    Her pregnancy is complicated by:  BMI >30    Objective   Physical Exam:   Weight: 74.8 kg (165 lb)  Expected Total Weight Gain: Could not be calculated   Pregravid BMI: Could not be calculated  BP: 118/64  Fetal Heart Rate: nst Fundal Height (cm): 37 cm Presentation: Vertex           Prenatal Labs  Urine Dip:  No results found for: \"KETONESU\", \"GLUCOSEUR\", \"LEUKOCYTESUR\"  Lab Results   Component Value Date    HGB 13.0 2024    HCT 39.3 2024    ABO B 11/15/2023    HEPBSAG Nonreactive 11/15/2023     No results found for: \"PAPPA\", \"AFP\", \"HCG\", \"ESTRIOL\", \"INHBA\"  No results found for: \"GLUF\", \"GLUT1\", \"WQOBLCH5CY\", \"WLKCOEJ4UU\"    Imaging  The most recent ultrasound was performed on   with a study GA of   and EFW of  .          Assessment/Plan   Diagnoses and all orders for this visit:  Obesity in pregnancy ([facility])  37 weeks gestation of pregnancy ([facility])  -     POCT UA Automated manually resulted  -     Fetal nonstress test  Leukocytes in urine  -     Urine Culture  NST (non-stress test) reactive on fetal surveillance ([facility])    Continue prenatal vitamin.  Labs reviewed.  GBS taken.  Expected mode of delivery vaginal; IOL scheduled for 24  Importance of daily Fetal Movement awareness, labor and warning s/sx reviewed    Follow up in 1 week for a routine prenatal visit.  "

## 2024-05-18 LAB — BACTERIA UR CULT: NORMAL

## 2024-05-22 ENCOUNTER — ROUTINE PRENATAL (OUTPATIENT)
Dept: OBSTETRICS AND GYNECOLOGY | Facility: CLINIC | Age: 34
End: 2024-05-22
Payer: COMMERCIAL

## 2024-05-22 VITALS — SYSTOLIC BLOOD PRESSURE: 122 MMHG | BODY MASS INDEX: 31.37 KG/M2 | WEIGHT: 166 LBS | DIASTOLIC BLOOD PRESSURE: 78 MMHG

## 2024-05-22 DIAGNOSIS — Z3A.38 38 WEEKS GESTATION OF PREGNANCY (HHS-HCC): ICD-10-CM

## 2024-05-22 DIAGNOSIS — O99.210 OBESITY IN PREGNANCY (HHS-HCC): Primary | ICD-10-CM

## 2024-05-22 LAB
POC BLOOD, URINE: NEGATIVE
POC GLUCOSE, URINE: NEGATIVE MG/DL
POC LEUKOCYTES, URINE: NEGATIVE
POC NITRITE,URINE: NEGATIVE
POC PROTEIN, URINE: ABNORMAL MG/DL

## 2024-05-22 PROCEDURE — 0501F PRENATAL FLOW SHEET: CPT | Performed by: OBSTETRICS & GYNECOLOGY

## 2024-05-22 PROCEDURE — 59025 FETAL NON-STRESS TEST: CPT | Performed by: OBSTETRICS & GYNECOLOGY

## 2024-05-22 NOTE — PROGRESS NOTES
"Subjective   Patient ID [id number]   Ephraim Diego is a 33 y.o.  at 38w4d with a working estimated date of delivery of 2024, by Ultrasound who presents for a routine prenatal visit. She denies vaginal bleeding, leakage of fluid, decreased fetal movements, or contractions.    Her pregnancy is complicated by:  BMI greater than 30.  Weekly NST.  NST reactive.  Scheduled for induction of labor May 29.  Importance of daily fetal movement.  Signs and symptoms of labor.  GBS negative    Objective   Physical Exam:      Expected Total Weight Gain: Could not be calculated   Pregravid BMI: Could not be calculated                     Prenatal Labs  Urine Dip:  No results found for: \"KETONESU\", \"GLUCOSEUR\", \"LEUKOCYTESUR\"  Lab Results   Component Value Date    HGB 13.0 2024    HCT 39.3 2024    ABO B 11/15/2023    HEPBSAG Nonreactive 11/15/2023     No results found for: \"PAPPA\", \"AFP\", \"HCG\", \"ESTRIOL\", \"INHBA\"  No results found for: \"GLUF\", \"GLUT1\", \"NCZJHGB0QY\", \"RKFZLNX3IB\"    Imaging  The most recent ultrasound was performed on   with a study GA of   and EFW of  .          Assessment/Plan   Weekly NST for BMI greater than 30  Continue prenatal vitamin.  Labs reviewed.  GBS taken.  GBS negative  Expected mode of delivery vaginal  Follow up in 1 week for a routine prenatal visit.  Vaginal exam next visit and then review induction protocols  "

## 2024-05-28 ENCOUNTER — PREP FOR PROCEDURE (OUTPATIENT)
Dept: OBSTETRICS AND GYNECOLOGY | Facility: HOSPITAL | Age: 34
End: 2024-05-28

## 2024-05-28 ENCOUNTER — ROUTINE PRENATAL (OUTPATIENT)
Dept: OBSTETRICS AND GYNECOLOGY | Facility: CLINIC | Age: 34
End: 2024-05-28
Payer: COMMERCIAL

## 2024-05-28 VITALS — DIASTOLIC BLOOD PRESSURE: 72 MMHG | WEIGHT: 166 LBS | SYSTOLIC BLOOD PRESSURE: 112 MMHG | BODY MASS INDEX: 31.37 KG/M2

## 2024-05-28 DIAGNOSIS — O99.210 OBESITY IN PREGNANCY (HHS-HCC): ICD-10-CM

## 2024-05-28 DIAGNOSIS — Z3A.39 39 WEEKS GESTATION OF PREGNANCY (HHS-HCC): ICD-10-CM

## 2024-05-28 DIAGNOSIS — Z3A.40 40 WEEKS GESTATION OF PREGNANCY (HHS-HCC): Primary | ICD-10-CM

## 2024-05-28 PROCEDURE — 0501F PRENATAL FLOW SHEET: CPT | Performed by: OBSTETRICS & GYNECOLOGY

## 2024-05-28 PROCEDURE — 59025 FETAL NON-STRESS TEST: CPT | Performed by: OBSTETRICS & GYNECOLOGY

## 2024-05-28 NOTE — PROGRESS NOTES
"Subjective   Patient ID [id number]   Ephraim Diego is a 33 y.o.  at 39w3d with a working estimated date of delivery of 2024, by Ultrasound who presents for a routine prenatal visit. She denies vaginal bleeding, leakage of fluid, decreased fetal movements, or contractions.    Her pregnancy is complicated by:  BMI greater than 30.  Nonstress test reactive.  Hejl for induction of labor tomorrow.  Plan is oral Cytotec followed by Pitocin.  Epidural as needed.  Reviewed risks of fetal heart rate changes, tachysystole, need for emergent or operative delivery    Objective   Physical Exam:   Weight: 75.3 kg (166 lb)  Expected Total Weight Gain: Could not be calculated   Pregravid BMI: Could not be calculated  BP: 112/72                  Prenatal Labs  Urine Dip:  No results found for: \"KETONESU\", \"GLUCOSEUR\", \"LEUKOCYTESUR\"  Lab Results   Component Value Date    HGB 13.0 2024    HCT 39.3 2024    ABO B 11/15/2023    HEPBSAG Nonreactive 11/15/2023     No results found for: \"PAPPA\", \"AFP\", \"HCG\", \"ESTRIOL\", \"INHBA\"  No results found for: \"GLUF\", \"GLUT1\", \"MZPSUPC6QE\", \"OHUSDLU6WI\"    Imaging  The most recent ultrasound was performed on   with a study GA of   and EFW of  .          Assessment/Plan   Scheduled for induction of labor tomorrow.  Start with oral Cytotec  Continue prenatal vitamin.  Labs reviewed.  GBS taken.  Expected mode of delivery vaginal  Follow up in 1 week for a routine prenatal visit.  "

## 2024-05-29 ENCOUNTER — APPOINTMENT (OUTPATIENT)
Dept: OBSTETRICS AND GYNECOLOGY | Facility: HOSPITAL | Age: 34
DRG: 833 | End: 2024-05-29
Payer: COMMERCIAL

## 2024-05-29 ENCOUNTER — HOSPITAL ENCOUNTER (INPATIENT)
Facility: HOSPITAL | Age: 34
LOS: 1 days | Discharge: HOME | DRG: 833 | End: 2024-05-29
Attending: OBSTETRICS & GYNECOLOGY | Admitting: OBSTETRICS & GYNECOLOGY
Payer: COMMERCIAL

## 2024-05-29 VITALS
HEART RATE: 71 BPM | HEIGHT: 61 IN | OXYGEN SATURATION: 95 % | RESPIRATION RATE: 16 BRPM | TEMPERATURE: 98.4 F | SYSTOLIC BLOOD PRESSURE: 134 MMHG | DIASTOLIC BLOOD PRESSURE: 78 MMHG | WEIGHT: 169.53 LBS | BODY MASS INDEX: 32.01 KG/M2

## 2024-05-29 DIAGNOSIS — Z34.90 TERM PREGNANCY (HHS-HCC): ICD-10-CM

## 2024-05-29 PROBLEM — N97.9 INFERTILITY, FEMALE: Status: RESOLVED | Noted: 2023-07-03 | Resolved: 2024-05-29

## 2024-05-29 PROBLEM — R89.8 ABNORMAL KARYOTYPE: Status: RESOLVED | Noted: 2023-06-28 | Resolved: 2024-05-29

## 2024-05-29 PROBLEM — Z14.8: Status: RESOLVED | Noted: 2023-07-03 | Resolved: 2024-05-29

## 2024-05-29 LAB
ABO GROUP (TYPE) IN BLOOD: NORMAL
ANTIBODY SCREEN: NORMAL
ERYTHROCYTE [DISTWIDTH] IN BLOOD BY AUTOMATED COUNT: 14.6 % (ref 11.5–14.5)
HCT VFR BLD AUTO: 40.2 % (ref 36–46)
HGB BLD-MCNC: 13.5 G/DL (ref 12–16)
MCH RBC QN AUTO: 30 PG (ref 26–34)
MCHC RBC AUTO-ENTMCNC: 33.6 G/DL (ref 32–36)
MCV RBC AUTO: 89 FL (ref 80–100)
NRBC BLD-RTO: 0 /100 WBCS (ref 0–0)
PLATELET # BLD AUTO: 145 X10*3/UL (ref 150–450)
RBC # BLD AUTO: 4.5 X10*6/UL (ref 4–5.2)
RH FACTOR (ANTIGEN D): NORMAL
WBC # BLD AUTO: 6.3 X10*3/UL (ref 4.4–11.3)

## 2024-05-29 PROCEDURE — 1120000001 HC OB PRIVATE ROOM DAILY

## 2024-05-29 PROCEDURE — 86900 BLOOD TYPING SEROLOGIC ABO: CPT | Performed by: OBSTETRICS & GYNECOLOGY

## 2024-05-29 PROCEDURE — 86901 BLOOD TYPING SEROLOGIC RH(D): CPT | Performed by: OBSTETRICS & GYNECOLOGY

## 2024-05-29 PROCEDURE — G0378 HOSPITAL OBSERVATION PER HR: HCPCS

## 2024-05-29 PROCEDURE — 85027 COMPLETE CBC AUTOMATED: CPT | Performed by: OBSTETRICS & GYNECOLOGY

## 2024-05-29 PROCEDURE — 2500000001 HC RX 250 WO HCPCS SELF ADMINISTERED DRUGS (ALT 637 FOR MEDICARE OP): Performed by: OBSTETRICS & GYNECOLOGY

## 2024-05-29 PROCEDURE — 36415 COLL VENOUS BLD VENIPUNCTURE: CPT | Performed by: OBSTETRICS & GYNECOLOGY

## 2024-05-29 PROCEDURE — 99239 HOSP IP/OBS DSCHRG MGMT >30: CPT | Performed by: OBSTETRICS & GYNECOLOGY

## 2024-05-29 PROCEDURE — 59050 FETAL MONITOR W/REPORT: CPT

## 2024-05-29 RX ORDER — ONDANSETRON 4 MG/1
4 TABLET, FILM COATED ORAL EVERY 6 HOURS PRN
Status: DISCONTINUED | OUTPATIENT
Start: 2024-05-29 | End: 2024-05-30 | Stop reason: HOSPADM

## 2024-05-29 RX ORDER — METHYLERGONOVINE MALEATE 0.2 MG/ML
0.2 INJECTION INTRAVENOUS ONCE AS NEEDED
Status: DISCONTINUED | OUTPATIENT
Start: 2024-05-29 | End: 2024-05-30 | Stop reason: HOSPADM

## 2024-05-29 RX ORDER — OXYTOCIN/0.9 % SODIUM CHLORIDE 30/500 ML
2-30 PLASTIC BAG, INJECTION (ML) INTRAVENOUS CONTINUOUS
Status: DISCONTINUED | OUTPATIENT
Start: 2024-05-29 | End: 2024-05-30 | Stop reason: HOSPADM

## 2024-05-29 RX ORDER — LABETALOL HYDROCHLORIDE 5 MG/ML
20 INJECTION, SOLUTION INTRAVENOUS ONCE AS NEEDED
Status: DISCONTINUED | OUTPATIENT
Start: 2024-05-29 | End: 2024-05-30 | Stop reason: HOSPADM

## 2024-05-29 RX ORDER — CARBOPROST TROMETHAMINE 250 UG/ML
250 INJECTION, SOLUTION INTRAMUSCULAR ONCE AS NEEDED
Status: DISCONTINUED | OUTPATIENT
Start: 2024-05-29 | End: 2024-05-30 | Stop reason: HOSPADM

## 2024-05-29 RX ORDER — OXYTOCIN 10 [USP'U]/ML
10 INJECTION, SOLUTION INTRAMUSCULAR; INTRAVENOUS ONCE AS NEEDED
Status: DISCONTINUED | OUTPATIENT
Start: 2024-05-29 | End: 2024-05-30 | Stop reason: HOSPADM

## 2024-05-29 RX ORDER — SODIUM CHLORIDE, SODIUM LACTATE, POTASSIUM CHLORIDE, CALCIUM CHLORIDE 600; 310; 30; 20 MG/100ML; MG/100ML; MG/100ML; MG/100ML
125 INJECTION, SOLUTION INTRAVENOUS CONTINUOUS
Status: DISCONTINUED | OUTPATIENT
Start: 2024-05-29 | End: 2024-05-30 | Stop reason: HOSPADM

## 2024-05-29 RX ORDER — LOPERAMIDE HYDROCHLORIDE 2 MG/1
4 CAPSULE ORAL EVERY 2 HOUR PRN
Status: DISCONTINUED | OUTPATIENT
Start: 2024-05-29 | End: 2024-05-30 | Stop reason: HOSPADM

## 2024-05-29 RX ORDER — HYDRALAZINE HYDROCHLORIDE 20 MG/ML
5 INJECTION INTRAMUSCULAR; INTRAVENOUS ONCE AS NEEDED
Status: DISCONTINUED | OUTPATIENT
Start: 2024-05-29 | End: 2024-05-30 | Stop reason: HOSPADM

## 2024-05-29 RX ORDER — METOCLOPRAMIDE 10 MG/1
10 TABLET ORAL EVERY 6 HOURS PRN
Status: DISCONTINUED | OUTPATIENT
Start: 2024-05-29 | End: 2024-05-30 | Stop reason: HOSPADM

## 2024-05-29 RX ORDER — NIFEDIPINE 10 MG/1
10 CAPSULE ORAL ONCE AS NEEDED
Status: DISCONTINUED | OUTPATIENT
Start: 2024-05-29 | End: 2024-05-30 | Stop reason: HOSPADM

## 2024-05-29 RX ORDER — ENOXAPARIN SODIUM 100 MG/ML
40 INJECTION SUBCUTANEOUS EVERY 24 HOURS
Status: DISCONTINUED | OUTPATIENT
Start: 2024-05-29 | End: 2024-05-30 | Stop reason: HOSPADM

## 2024-05-29 RX ORDER — OXYTOCIN/0.9 % SODIUM CHLORIDE 30/500 ML
60 PLASTIC BAG, INJECTION (ML) INTRAVENOUS ONCE AS NEEDED
Status: DISCONTINUED | OUTPATIENT
Start: 2024-05-29 | End: 2024-05-30 | Stop reason: HOSPADM

## 2024-05-29 RX ORDER — TRANEXAMIC ACID 100 MG/ML
1000 INJECTION, SOLUTION INTRAVENOUS ONCE AS NEEDED
Status: DISCONTINUED | OUTPATIENT
Start: 2024-05-29 | End: 2024-05-30 | Stop reason: HOSPADM

## 2024-05-29 RX ORDER — METOCLOPRAMIDE HYDROCHLORIDE 5 MG/ML
10 INJECTION INTRAMUSCULAR; INTRAVENOUS EVERY 6 HOURS PRN
Status: DISCONTINUED | OUTPATIENT
Start: 2024-05-29 | End: 2024-05-30 | Stop reason: HOSPADM

## 2024-05-29 RX ORDER — MISOPROSTOL 200 UG/1
800 TABLET ORAL ONCE AS NEEDED
Status: DISCONTINUED | OUTPATIENT
Start: 2024-05-29 | End: 2024-05-30 | Stop reason: HOSPADM

## 2024-05-29 RX ORDER — TERBUTALINE SULFATE 1 MG/ML
0.25 INJECTION SUBCUTANEOUS ONCE AS NEEDED
Status: DISCONTINUED | OUTPATIENT
Start: 2024-05-29 | End: 2024-05-30 | Stop reason: HOSPADM

## 2024-05-29 RX ORDER — ONDANSETRON HYDROCHLORIDE 2 MG/ML
4 INJECTION, SOLUTION INTRAVENOUS EVERY 6 HOURS PRN
Status: DISCONTINUED | OUTPATIENT
Start: 2024-05-29 | End: 2024-05-30 | Stop reason: HOSPADM

## 2024-05-29 RX ORDER — LIDOCAINE HYDROCHLORIDE 10 MG/ML
30 INJECTION INFILTRATION; PERINEURAL ONCE AS NEEDED
Status: DISCONTINUED | OUTPATIENT
Start: 2024-05-29 | End: 2024-05-30 | Stop reason: HOSPADM

## 2024-05-29 RX ADMIN — MISOPROSTOL 25 MCG: 100 TABLET ORAL at 15:31

## 2024-05-29 RX ADMIN — MISOPROSTOL 25 MCG: 100 TABLET ORAL at 11:32

## 2024-05-29 RX ADMIN — MISOPROSTOL 25 MCG: 100 TABLET ORAL at 13:31

## 2024-05-29 RX ADMIN — MISOPROSTOL 25 MCG: 100 TABLET ORAL at 09:30

## 2024-05-29 RX ADMIN — MISOPROSTOL 25 MCG: 100 TABLET ORAL at 17:36

## 2024-05-29 SDOH — HEALTH STABILITY: MENTAL HEALTH: HOW OFTEN DO YOU HAVE SIX OR MORE DRINKS ON ONE OCCASION?: NEVER

## 2024-05-29 SDOH — HEALTH STABILITY: MENTAL HEALTH: HOW MANY DRINKS CONTAINING ALCOHOL DO YOU HAVE ON A TYPICAL DAY WHEN YOU ARE DRINKING?: PATIENT DOES NOT DRINK

## 2024-05-29 SDOH — SOCIAL STABILITY: SOCIAL INSECURITY: WITHIN THE LAST YEAR, HAVE YOU BEEN HUMILIATED OR EMOTIONALLY ABUSED IN OTHER WAYS BY YOUR PARTNER OR EX-PARTNER?: NO

## 2024-05-29 SDOH — SOCIAL STABILITY: SOCIAL INSECURITY: WITHIN THE LAST YEAR, HAVE YOU BEEN AFRAID OF YOUR PARTNER OR EX-PARTNER?: NO

## 2024-05-29 SDOH — HEALTH STABILITY: MENTAL HEALTH: WISH TO BE DEAD (PAST 1 MONTH): NO

## 2024-05-29 SDOH — HEALTH STABILITY: MENTAL HEALTH: HOW OFTEN DO YOU HAVE 6 OR MORE DRINKS ON ONE OCCASION?: NEVER

## 2024-05-29 SDOH — SOCIAL STABILITY: SOCIAL INSECURITY
WITHIN THE LAST YEAR, HAVE YOU BEEN KICKED, HIT, SLAPPED, OR OTHERWISE PHYSICALLY HURT BY YOUR PARTNER OR EX-PARTNER?: NO

## 2024-05-29 SDOH — SOCIAL STABILITY: SOCIAL INSECURITY
WITHIN THE LAST YEAR, HAVE YOU BEEN RAPED OR FORCED TO HAVE ANY KIND OF SEXUAL ACTIVITY BY YOUR PARTNER OR EX-PARTNER?: NO

## 2024-05-29 SDOH — SOCIAL STABILITY: SOCIAL INSECURITY
WITHIN THE LAST YEAR, HAVE TO BEEN RAPED OR FORCED TO HAVE ANY KIND OF SEXUAL ACTIVITY BY YOUR PARTNER OR EX-PARTNER?: NO

## 2024-05-29 SDOH — SOCIAL STABILITY: SOCIAL INSECURITY: PHYSICAL ABUSE: DENIES

## 2024-05-29 SDOH — HEALTH STABILITY: MENTAL HEALTH: HAVE YOU USED ANY PRESCRIPTION DRUGS OTHER THAN PRESCRIBED IN THE PAST 12 MONTHS?: NO

## 2024-05-29 SDOH — SOCIAL STABILITY: SOCIAL INSECURITY: HAVE YOU HAD THOUGHTS OF HARMING ANYONE ELSE?: NO

## 2024-05-29 SDOH — HEALTH STABILITY: MENTAL HEALTH: HOW OFTEN DO YOU HAVE A DRINK CONTAINING ALCOHOL?: NEVER

## 2024-05-29 SDOH — ECONOMIC STABILITY: HOUSING INSECURITY: DO YOU FEEL UNSAFE GOING BACK TO THE PLACE WHERE YOU ARE LIVING?: NO

## 2024-05-29 SDOH — SOCIAL STABILITY: SOCIAL INSECURITY: VERBAL ABUSE: DENIES

## 2024-05-29 SDOH — SOCIAL STABILITY: SOCIAL INSECURITY: ARE YOU OR HAVE YOU BEEN THREATENED OR ABUSED PHYSICALLY, EMOTIONALLY, OR SEXUALLY BY ANYONE?: NO

## 2024-05-29 SDOH — SOCIAL STABILITY: SOCIAL INSECURITY: HAVE YOU HAD ANY THOUGHTS OF HARMING ANYONE ELSE?: NO

## 2024-05-29 SDOH — HEALTH STABILITY: MENTAL HEALTH: HAVE YOU USED ANY SUBSTANCES (CANABIS, COCAINE, HEROIN, HALLUCINOGENS, INHALANTS, ETC.) IN THE PAST 12 MONTHS?: NO

## 2024-05-29 SDOH — SOCIAL STABILITY: SOCIAL INSECURITY: DO YOU FEEL ANYONE HAS EXPLOITED OR TAKEN ADVANTAGE OF YOU FINANCIALLY OR OF YOUR PERSONAL PROPERTY?: NO

## 2024-05-29 SDOH — SOCIAL STABILITY: SOCIAL INSECURITY: HAS ANYONE EVER THREATENED TO HURT YOUR FAMILY OR YOUR PETS?: NO

## 2024-05-29 SDOH — SOCIAL STABILITY: SOCIAL INSECURITY: ABUSE SCREEN: ADULT

## 2024-05-29 SDOH — HEALTH STABILITY: MENTAL HEALTH: WERE YOU ABLE TO COMPLETE ALL THE BEHAVIORAL HEALTH SCREENINGS?: YES

## 2024-05-29 SDOH — SOCIAL STABILITY: SOCIAL INSECURITY: DOES ANYONE TRY TO KEEP YOU FROM HAVING/CONTACTING OTHER FRIENDS OR DOING THINGS OUTSIDE YOUR HOME?: NO

## 2024-05-29 SDOH — HEALTH STABILITY: MENTAL HEALTH: HOW MANY STANDARD DRINKS CONTAINING ALCOHOL DO YOU HAVE ON A TYPICAL DAY?: PATIENT DOES NOT DRINK

## 2024-05-29 SDOH — HEALTH STABILITY: MENTAL HEALTH: NON-SPECIFIC ACTIVE SUICIDAL THOUGHTS (PAST 1 MONTH): NO

## 2024-05-29 SDOH — HEALTH STABILITY: MENTAL HEALTH: SUICIDAL BEHAVIOR (LIFETIME): NO

## 2024-05-29 ASSESSMENT — ACTIVITIES OF DAILY LIVING (ADL)
WALKS IN HOME: INDEPENDENT
ADEQUATE_TO_COMPLETE_ADL: YES
FEEDING YOURSELF: INDEPENDENT
GROOMING: INDEPENDENT
BATHING: INDEPENDENT
DRESSING YOURSELF: INDEPENDENT
JUDGMENT_ADEQUATE_SAFELY_COMPLETE_DAILY_ACTIVITIES: YES
TOILETING: INDEPENDENT
HEARING - RIGHT EAR: FUNCTIONAL
HEARING - LEFT EAR: FUNCTIONAL
PATIENT'S MEMORY ADEQUATE TO SAFELY COMPLETE DAILY ACTIVITIES?: YES

## 2024-05-29 ASSESSMENT — PAIN SCALES - GENERAL
PAINLEVEL_OUTOF10: 0 - NO PAIN
PAINLEVEL_OUTOF10: 0 - NO PAIN

## 2024-05-29 ASSESSMENT — LIFESTYLE VARIABLES
HOW OFTEN DO YOU HAVE A DRINK CONTAINING ALCOHOL: NEVER
AUDIT-C TOTAL SCORE: 0
AUDIT-C TOTAL SCORE: 0
HOW OFTEN DO YOU HAVE 6 OR MORE DRINKS ON ONE OCCASION: NEVER
SKIP TO QUESTIONS 9-10: 1
AUDIT-C TOTAL SCORE: 0
HOW MANY STANDARD DRINKS CONTAINING ALCOHOL DO YOU HAVE ON A TYPICAL DAY: PATIENT DOES NOT DRINK
SKIP TO QUESTIONS 9-10: 1

## 2024-05-29 ASSESSMENT — PATIENT HEALTH QUESTIONNAIRE - PHQ9
SUM OF ALL RESPONSES TO PHQ9 QUESTIONS 1 & 2: 0
2. FEELING DOWN, DEPRESSED OR HOPELESS: NOT AT ALL
1. LITTLE INTEREST OR PLEASURE IN DOING THINGS: NOT AT ALL

## 2024-05-29 NOTE — H&P
Obstetrical Admission History and Physical     Ephraim Diego is a 33 y.o.  at 39w4d. KYLE: 2024, by Ultrasound. Estimated fetal weight: 7 pounds. She has had prenatal care with Dr. Willis .    Chief Complaint: Scheduled Induction (39.4 wks elective induction)    Assessment/Plan    -T&S, CBC  -Oral Cytotec  -GBS negative.    Principal Problem:    Term pregnancy ([facility])      Pregnancy Problems (from 01/10/24 to present)       Problem Noted Resolved    Term pregnancy ([facility]) 2024 by Javier Willis MD No    Priority:  Medium                Subjective   34 yo  at 39.4 weeks, GBS negative for induction of labor.    Reason for Induction of Labor:  Pregnancy at 39 weeks or greater for induction        Obstetrical History   OB History    Para Term  AB Living   2             SAB IAB Ectopic Multiple Live Births                  # Outcome Date GA Lbr Eugenio/2nd Weight Sex Delivery Anes PTL Lv   2 Current            1                 Past Medical History  No past medical history on file.     Past Surgical History   No past surgical history on file.    Social History  Social History     Tobacco Use    Smoking status: Never    Smokeless tobacco: Never   Substance Use Topics    Alcohol use: Not on file     Substance and Sexual Activity   Drug Use Not on file       Allergies  Patient has no known allergies.     Medications  Medications Prior to Admission   Medication Sig Dispense Refill Last Dose    prenatal no115/iron/folic acid (PRENATAL 19 ORAL) Take 1 tablet by mouth once daily.   2024 at 1300       Objective    Last Vitals  Temp Pulse Resp BP MAP O2 Sat   36.8 °C (98.3 °F) 81 16 120/71   97 %     Physical Examination  GENERAL: Examination reveals a well developed, well nourished, gravid female in no acute distress. She is alert and cooperative.  LUNGS: clear to auscultation bilaterally  HEART: regular rate and rhythm, S1, S2 normal, no murmur, click, rub or gallop  ABDOMEN: soft,  gravid, nontender, nondistended, no abnormal masses, no epigastric pain  FHR is 130s , with Accelerations, and a Category I tracing.    [place] reading:  none  The fetus is in a vertex presentation, determined by vaginal exam  CERVIX: Closed cm dilated, 50 % effaced, -3 station; MEMBRANES are Intact  EXTREMITIES: no redness or tenderness in the calves or thighs, no edema  PSYCHOLOGICAL: awake and alert; oriented to person, place, and time    Lab Review  Lab Results   Component Value Date    WBC 6.3 05/29/2024    HGB 13.5 05/29/2024    HCT 40.2 05/29/2024     (L) 05/29/2024

## 2024-05-29 NOTE — LACTATION NOTE
Lactation Consultant Note  Lactation Consultation       Maternal Information       Maternal Assessment       Infant Assessment       Feeding Assessment       LATCH TOOL       Breast Pump       Other OB Lactation Tools       Patient Follow-up       Other OB Lactation Documentation       Recommendations/Summary   induction of labor. Mother states she would like to breastfeed for 1 year. Mother has taken a breastfeeding class at [facility]. Mother endorses positive breast changes during this pregnancy and denies any history of breast surgeries. Mother has a hands free pump, as well as, a double electric breast pump for home use. LC reviewed using the electric breast pump whenever possible to allow massage and compression during pumping session, as well as, being able to relax and allow milk to let down for efficiently. Mother verbalized understanding. Reviewed normal  behavior in the first 24 hours, as well as, patterns of feeding and elimination. Parents given opportunity to ask questions. All questions answered at this time.  Offered ongoing assistance with breastfeeding.

## 2024-05-30 ENCOUNTER — PREP FOR PROCEDURE (OUTPATIENT)
Dept: OBSTETRICS AND GYNECOLOGY | Facility: HOSPITAL | Age: 34
End: 2024-05-30

## 2024-05-30 ENCOUNTER — APPOINTMENT (OUTPATIENT)
Dept: OBSTETRICS AND GYNECOLOGY | Facility: CLINIC | Age: 34
End: 2024-05-30
Payer: COMMERCIAL

## 2024-05-30 RX ORDER — LOPERAMIDE HYDROCHLORIDE 2 MG/1
4 CAPSULE ORAL EVERY 2 HOUR PRN
Status: CANCELLED | OUTPATIENT
Start: 2024-05-30

## 2024-05-30 RX ORDER — METOCLOPRAMIDE HYDROCHLORIDE 5 MG/ML
10 INJECTION INTRAMUSCULAR; INTRAVENOUS EVERY 6 HOURS PRN
Status: CANCELLED | OUTPATIENT
Start: 2024-05-30

## 2024-05-30 RX ORDER — MISOPROSTOL 100 UG/1
800 TABLET ORAL ONCE AS NEEDED
Status: CANCELLED | OUTPATIENT
Start: 2024-05-30

## 2024-05-30 RX ORDER — ONDANSETRON 4 MG/1
4 TABLET, FILM COATED ORAL EVERY 6 HOURS PRN
Status: CANCELLED | OUTPATIENT
Start: 2024-05-30

## 2024-05-30 RX ORDER — OXYTOCIN 10 [USP'U]/ML
10 INJECTION, SOLUTION INTRAMUSCULAR; INTRAVENOUS ONCE AS NEEDED
Status: CANCELLED | OUTPATIENT
Start: 2024-05-30

## 2024-05-30 RX ORDER — LIDOCAINE HYDROCHLORIDE 10 MG/ML
30 INJECTION INFILTRATION; PERINEURAL ONCE AS NEEDED
Status: CANCELLED | OUTPATIENT
Start: 2024-05-30

## 2024-05-30 RX ORDER — LABETALOL HYDROCHLORIDE 5 MG/ML
20 INJECTION, SOLUTION INTRAVENOUS ONCE AS NEEDED
Status: CANCELLED | OUTPATIENT
Start: 2024-05-30

## 2024-05-30 RX ORDER — CARBOPROST TROMETHAMINE 250 UG/ML
250 INJECTION, SOLUTION INTRAMUSCULAR ONCE AS NEEDED
Status: CANCELLED | OUTPATIENT
Start: 2024-05-30

## 2024-05-30 RX ORDER — METOCLOPRAMIDE 5 MG/1
10 TABLET ORAL EVERY 6 HOURS PRN
Status: CANCELLED | OUTPATIENT
Start: 2024-05-30

## 2024-05-30 RX ORDER — METHYLERGONOVINE MALEATE 0.2 MG/ML
0.2 INJECTION INTRAVENOUS ONCE AS NEEDED
Status: CANCELLED | OUTPATIENT
Start: 2024-05-30

## 2024-05-30 RX ORDER — NIFEDIPINE 10 MG/1
10 CAPSULE ORAL ONCE AS NEEDED
Status: CANCELLED | OUTPATIENT
Start: 2024-05-30

## 2024-05-30 RX ORDER — ONDANSETRON HYDROCHLORIDE 2 MG/ML
4 INJECTION, SOLUTION INTRAVENOUS EVERY 6 HOURS PRN
Status: CANCELLED | OUTPATIENT
Start: 2024-05-30

## 2024-05-30 RX ORDER — TERBUTALINE SULFATE 1 MG/ML
0.25 INJECTION SUBCUTANEOUS ONCE AS NEEDED
Status: CANCELLED | OUTPATIENT
Start: 2024-05-30

## 2024-05-30 RX ORDER — HYDRALAZINE HYDROCHLORIDE 20 MG/ML
5 INJECTION INTRAMUSCULAR; INTRAVENOUS ONCE AS NEEDED
Status: CANCELLED | OUTPATIENT
Start: 2024-05-30

## 2024-05-30 RX ORDER — ENOXAPARIN SODIUM 300 MG/3ML
40 INJECTION INTRAVENOUS; SUBCUTANEOUS EVERY 24 HOURS
Status: CANCELLED | OUTPATIENT
Start: 2024-05-30

## 2024-05-30 RX ORDER — TRANEXAMIC ACID 100 MG/ML
1000 INJECTION, SOLUTION INTRAVENOUS ONCE AS NEEDED
Status: CANCELLED | OUTPATIENT
Start: 2024-05-30

## 2024-05-30 RX ORDER — SODIUM CHLORIDE, SODIUM LACTATE, POTASSIUM CHLORIDE, CALCIUM CHLORIDE 600; 310; 30; 20 MG/100ML; MG/100ML; MG/100ML; MG/100ML
125 INJECTION, SOLUTION INTRAVENOUS CONTINUOUS
Status: CANCELLED | OUTPATIENT
Start: 2024-05-30

## 2024-05-30 NOTE — DISCHARGE INSTRUCTIONS
[facility]  [address] [place] [address]    OB Triage Discharge    Dr. Willis will call with scheduled induction time    Please keep all upcoming scheduled appointments  If you do not have a scheduled appointment for routine care, please call the office to schedule one in the next 1-2 weeks    Call the office immediately for any of the following pregnancy symptoms:  Painful contractions, regular contractions that are worsening over time, or any new/persistent pelvic or abdominal pain  Leaking amniotic fluid  Vaginal bleeding  Decreased or absent fetal movement

## 2024-05-30 NOTE — NURSING NOTE
Pt leaving in stable condition with no concerns or complaints. Leaving with support person. Discharge instructions given.

## 2024-05-30 NOTE — DISCHARGE SUMMARY
Discharge Summary    Admission Date: 5/29/2024  Discharge Date: 5/29/24    Discharge Diagnosis  Term pregnancy ([facility])    Hospital Course  Patient presented for attempted induction and received 6 doses of Cytotec. Following her last dose of Cytotec she was still unfavorable, with FHT Cat I and normal vital signs. Patient was offered discharge with plan for second induction attempt later in the week.      Procedures: none    Pertinent Physical Exam At Time of Discharge  GENERAL: Examination reveals a well developed, well nourished, gravid female in no acute distress. She is alert and cooperative.  LUNGS:  appropriate effort  FHR is 140 , with Accelerations, and a Category I tracing.    Rupert reading:  every 2 minutes  CERVIX: 1 cm dilated, 60 % effaced, -3 station; MEMBRANES are Intact  PSYCHOLOGICAL: awake and alert; oriented to person, place, and time      Discharge Meds     Your medication list        CONTINUE taking these medications        Instructions Last Dose Given Next Dose Due   PRENATAL 19 ORAL                     Complications Requiring Follow-Up  None    Test Results Pending At Discharge  Pending Labs       No current pending labs.            Outpatient Follow-Up  Dr. Willis to call and schedule induction appointment and will update patient with date/time.    I spent 31 minutes in the professional and overall care of this patient.      Lora Kilgore DO

## 2024-05-30 NOTE — SIGNIFICANT EVENT
CTPR at request of primary OB to check patient before potential discharge home. She was scheduled as a Cytotec induction and has had 6 Cytotec. She is ngoc every 2 minutes but is overall comfortable with contractions. FHT Cat I and BP normal range. CVX 1/70/-3. Per Dr. Willis, plan for discharge home and reschedule patient for another induction attempt later this week. Discussed si/sx labor with patient and patient's . Precautions given.   Lora Kilgore, DO

## 2024-06-02 ENCOUNTER — HOSPITAL ENCOUNTER (INPATIENT)
Facility: HOSPITAL | Age: 34
LOS: 3 days | Discharge: HOME | End: 2024-06-05
Attending: OBSTETRICS & GYNECOLOGY | Admitting: OBSTETRICS & GYNECOLOGY
Payer: COMMERCIAL

## 2024-06-02 ENCOUNTER — APPOINTMENT (OUTPATIENT)
Dept: OBSTETRICS AND GYNECOLOGY | Facility: HOSPITAL | Age: 34
End: 2024-06-02
Payer: COMMERCIAL

## 2024-06-02 ENCOUNTER — PREP FOR PROCEDURE (OUTPATIENT)
Dept: OBSTETRICS AND GYNECOLOGY | Facility: HOSPITAL | Age: 34
End: 2024-06-02
Payer: COMMERCIAL

## 2024-06-02 ENCOUNTER — PATIENT OUTREACH (OUTPATIENT)
Dept: OBSTETRICS AND GYNECOLOGY | Facility: HOSPITAL | Age: 34
End: 2024-06-02
Payer: COMMERCIAL

## 2024-06-02 DIAGNOSIS — Z3A.40 40 WEEKS GESTATION OF PREGNANCY (HHS-HCC): ICD-10-CM

## 2024-06-02 DIAGNOSIS — O99.210 OBESITY IN PREGNANCY (HHS-HCC): ICD-10-CM

## 2024-06-02 DIAGNOSIS — Z34.90 TERM PREGNANCY (HHS-HCC): ICD-10-CM

## 2024-06-02 LAB
ABO GROUP (TYPE) IN BLOOD: NORMAL
ANTIBODY SCREEN: NORMAL
ERYTHROCYTE [DISTWIDTH] IN BLOOD BY AUTOMATED COUNT: 14.4 % (ref 11.5–14.5)
HCT VFR BLD AUTO: 39.4 % (ref 36–46)
HGB BLD-MCNC: 13.4 G/DL (ref 12–16)
MCH RBC QN AUTO: 29.8 PG (ref 26–34)
MCHC RBC AUTO-ENTMCNC: 34 G/DL (ref 32–36)
MCV RBC AUTO: 88 FL (ref 80–100)
NRBC BLD-RTO: 0 /100 WBCS (ref 0–0)
PLATELET # BLD AUTO: 137 X10*3/UL (ref 150–450)
RBC # BLD AUTO: 4.5 X10*6/UL (ref 4–5.2)
RH FACTOR (ANTIGEN D): NORMAL
WBC # BLD AUTO: 7.1 X10*3/UL (ref 4.4–11.3)

## 2024-06-02 PROCEDURE — 36415 COLL VENOUS BLD VENIPUNCTURE: CPT | Performed by: OBSTETRICS & GYNECOLOGY

## 2024-06-02 PROCEDURE — 2500000001 HC RX 250 WO HCPCS SELF ADMINISTERED DRUGS (ALT 637 FOR MEDICARE OP): Performed by: OBSTETRICS & GYNECOLOGY

## 2024-06-02 PROCEDURE — 85027 COMPLETE CBC AUTOMATED: CPT | Performed by: OBSTETRICS & GYNECOLOGY

## 2024-06-02 PROCEDURE — 1120000001 HC OB PRIVATE ROOM DAILY

## 2024-06-02 PROCEDURE — 86850 RBC ANTIBODY SCREEN: CPT | Performed by: OBSTETRICS & GYNECOLOGY

## 2024-06-02 RX ORDER — TERBUTALINE SULFATE 1 MG/ML
0.25 INJECTION SUBCUTANEOUS ONCE AS NEEDED
Status: DISCONTINUED | OUTPATIENT
Start: 2024-06-02 | End: 2024-06-03

## 2024-06-02 RX ORDER — OXYTOCIN 10 [USP'U]/ML
10 INJECTION, SOLUTION INTRAMUSCULAR; INTRAVENOUS ONCE AS NEEDED
Status: DISCONTINUED | OUTPATIENT
Start: 2024-06-02 | End: 2024-06-03

## 2024-06-02 RX ORDER — METOCLOPRAMIDE HYDROCHLORIDE 5 MG/ML
10 INJECTION INTRAMUSCULAR; INTRAVENOUS EVERY 6 HOURS PRN
Status: DISCONTINUED | OUTPATIENT
Start: 2024-06-02 | End: 2024-06-05 | Stop reason: HOSPADM

## 2024-06-02 RX ORDER — ONDANSETRON 4 MG/1
4 TABLET, FILM COATED ORAL EVERY 6 HOURS PRN
Status: DISCONTINUED | OUTPATIENT
Start: 2024-06-02 | End: 2024-06-05 | Stop reason: HOSPADM

## 2024-06-02 RX ORDER — NIFEDIPINE 10 MG/1
10 CAPSULE ORAL ONCE AS NEEDED
Status: DISCONTINUED | OUTPATIENT
Start: 2024-06-02 | End: 2024-06-03

## 2024-06-02 RX ORDER — ONDANSETRON HYDROCHLORIDE 2 MG/ML
4 INJECTION, SOLUTION INTRAVENOUS EVERY 6 HOURS PRN
Status: DISCONTINUED | OUTPATIENT
Start: 2024-06-02 | End: 2024-06-05 | Stop reason: HOSPADM

## 2024-06-02 RX ORDER — SODIUM CHLORIDE, SODIUM LACTATE, POTASSIUM CHLORIDE, CALCIUM CHLORIDE 600; 310; 30; 20 MG/100ML; MG/100ML; MG/100ML; MG/100ML
125 INJECTION, SOLUTION INTRAVENOUS CONTINUOUS
Status: DISCONTINUED | OUTPATIENT
Start: 2024-06-02 | End: 2024-06-05 | Stop reason: HOSPADM

## 2024-06-02 RX ORDER — OXYTOCIN/0.9 % SODIUM CHLORIDE 30/500 ML
60 PLASTIC BAG, INJECTION (ML) INTRAVENOUS ONCE AS NEEDED
Status: DISCONTINUED | OUTPATIENT
Start: 2024-06-02 | End: 2024-06-03

## 2024-06-02 RX ORDER — OXYTOCIN/0.9 % SODIUM CHLORIDE 30/500 ML
2-30 PLASTIC BAG, INJECTION (ML) INTRAVENOUS CONTINUOUS
Status: DISCONTINUED | OUTPATIENT
Start: 2024-06-02 | End: 2024-06-05 | Stop reason: HOSPADM

## 2024-06-02 RX ORDER — TRANEXAMIC ACID 100 MG/ML
1000 INJECTION, SOLUTION INTRAVENOUS ONCE AS NEEDED
Status: DISCONTINUED | OUTPATIENT
Start: 2024-06-02 | End: 2024-06-03

## 2024-06-02 RX ORDER — METOCLOPRAMIDE 10 MG/1
10 TABLET ORAL EVERY 6 HOURS PRN
Status: DISCONTINUED | OUTPATIENT
Start: 2024-06-02 | End: 2024-06-05 | Stop reason: HOSPADM

## 2024-06-02 RX ORDER — LIDOCAINE HYDROCHLORIDE 10 MG/ML
30 INJECTION INFILTRATION; PERINEURAL ONCE AS NEEDED
Status: DISCONTINUED | OUTPATIENT
Start: 2024-06-02 | End: 2024-06-05 | Stop reason: HOSPADM

## 2024-06-02 RX ORDER — HYDRALAZINE HYDROCHLORIDE 20 MG/ML
5 INJECTION INTRAMUSCULAR; INTRAVENOUS ONCE AS NEEDED
Status: DISCONTINUED | OUTPATIENT
Start: 2024-06-02 | End: 2024-06-03

## 2024-06-02 RX ORDER — LOPERAMIDE HYDROCHLORIDE 2 MG/1
4 CAPSULE ORAL EVERY 2 HOUR PRN
Status: DISCONTINUED | OUTPATIENT
Start: 2024-06-02 | End: 2024-06-03

## 2024-06-02 RX ORDER — ENOXAPARIN SODIUM 100 MG/ML
40 INJECTION SUBCUTANEOUS EVERY 24 HOURS
Status: DISCONTINUED | OUTPATIENT
Start: 2024-06-03 | End: 2024-06-05 | Stop reason: HOSPADM

## 2024-06-02 RX ORDER — CARBOPROST TROMETHAMINE 250 UG/ML
250 INJECTION, SOLUTION INTRAMUSCULAR ONCE AS NEEDED
Status: DISCONTINUED | OUTPATIENT
Start: 2024-06-02 | End: 2024-06-03

## 2024-06-02 RX ORDER — METHYLERGONOVINE MALEATE 0.2 MG/ML
0.2 INJECTION INTRAVENOUS ONCE AS NEEDED
Status: DISCONTINUED | OUTPATIENT
Start: 2024-06-02 | End: 2024-06-03

## 2024-06-02 RX ORDER — LABETALOL HYDROCHLORIDE 5 MG/ML
20 INJECTION, SOLUTION INTRAVENOUS ONCE AS NEEDED
Status: DISCONTINUED | OUTPATIENT
Start: 2024-06-02 | End: 2024-06-03

## 2024-06-02 RX ORDER — MISOPROSTOL 200 UG/1
800 TABLET ORAL ONCE AS NEEDED
Status: DISCONTINUED | OUTPATIENT
Start: 2024-06-02 | End: 2024-06-03

## 2024-06-02 RX ADMIN — MISOPROSTOL 25 MCG: 100 TABLET ORAL at 21:14

## 2024-06-02 RX ADMIN — MISOPROSTOL 25 MCG: 100 TABLET ORAL at 23:17

## 2024-06-02 SDOH — HEALTH STABILITY: MENTAL HEALTH: WERE YOU ABLE TO COMPLETE ALL THE BEHAVIORAL HEALTH SCREENINGS?: YES

## 2024-06-02 SDOH — SOCIAL STABILITY: SOCIAL INSECURITY: DOES ANYONE TRY TO KEEP YOU FROM HAVING/CONTACTING OTHER FRIENDS OR DOING THINGS OUTSIDE YOUR HOME?: NO

## 2024-06-02 SDOH — HEALTH STABILITY: MENTAL HEALTH: WISH TO BE DEAD (PAST 1 MONTH): NO

## 2024-06-02 SDOH — SOCIAL STABILITY: SOCIAL INSECURITY: ABUSE SCREEN: ADULT

## 2024-06-02 SDOH — HEALTH STABILITY: MENTAL HEALTH: NON-SPECIFIC ACTIVE SUICIDAL THOUGHTS (PAST 1 MONTH): NO

## 2024-06-02 SDOH — SOCIAL STABILITY: SOCIAL INSECURITY: HAVE YOU HAD ANY THOUGHTS OF HARMING ANYONE ELSE?: NO

## 2024-06-02 SDOH — ECONOMIC STABILITY: FOOD INSECURITY: WITHIN THE PAST 12 MONTHS, THE FOOD YOU BOUGHT JUST DIDN'T LAST AND YOU DIDN'T HAVE MONEY TO GET MORE.: NEVER TRUE

## 2024-06-02 SDOH — HEALTH STABILITY: MENTAL HEALTH: SUICIDAL BEHAVIOR (LIFETIME): NO

## 2024-06-02 SDOH — SOCIAL STABILITY: SOCIAL INSECURITY: HAVE YOU HAD THOUGHTS OF HARMING ANYONE ELSE?: NO

## 2024-06-02 SDOH — ECONOMIC STABILITY: TRANSPORTATION INSECURITY
IN THE PAST 12 MONTHS, HAS LACK OF TRANSPORTATION KEPT YOU FROM MEETINGS, WORK, OR FROM GETTING THINGS NEEDED FOR DAILY LIVING?: NO

## 2024-06-02 SDOH — ECONOMIC STABILITY: FOOD INSECURITY: WITHIN THE PAST 12 MONTHS, YOU WORRIED THAT YOUR FOOD WOULD RUN OUT BEFORE YOU GOT THE MONEY TO BUY MORE.: NEVER TRUE

## 2024-06-02 SDOH — ECONOMIC STABILITY: TRANSPORTATION INSECURITY

## 2024-06-02 SDOH — HEALTH STABILITY: MENTAL HEALTH: HAVE YOU USED ANY PRESCRIPTION DRUGS OTHER THAN PRESCRIBED IN THE PAST 12 MONTHS?: NO

## 2024-06-02 SDOH — SOCIAL STABILITY: SOCIAL INSECURITY: VERBAL ABUSE: DENIES

## 2024-06-02 SDOH — SOCIAL STABILITY: SOCIAL INSECURITY: ARE YOU OR HAVE YOU BEEN THREATENED OR ABUSED PHYSICALLY, EMOTIONALLY, OR SEXUALLY BY ANYONE?: NO

## 2024-06-02 SDOH — SOCIAL STABILITY: SOCIAL INSECURITY: PHYSICAL ABUSE: DENIES

## 2024-06-02 SDOH — ECONOMIC STABILITY: FOOD INSECURITY: WITHIN THE PAST 12 MONTHS, YOU WORRIED THAT YOUR FOOD WOULD RUN OUT BEFORE YOU GOT MONEY TO BUY MORE.: NEVER TRUE

## 2024-06-02 SDOH — ECONOMIC STABILITY: TRANSPORTATION INSECURITY
IN THE PAST 12 MONTHS, HAS THE LACK OF TRANSPORTATION KEPT YOU FROM MEDICAL APPOINTMENTS OR FROM GETTING MEDICATIONS?: NO

## 2024-06-02 SDOH — HEALTH STABILITY: MENTAL HEALTH: HAVE YOU USED ANY SUBSTANCES (CANABIS, COCAINE, HEROIN, HALLUCINOGENS, INHALANTS, ETC.) IN THE PAST 12 MONTHS?: NO

## 2024-06-02 SDOH — SOCIAL STABILITY: SOCIAL INSECURITY: ARE THERE ANY APPARENT SIGNS OF INJURIES/BEHAVIORS THAT COULD BE RELATED TO ABUSE/NEGLECT?: NO

## 2024-06-02 SDOH — ECONOMIC STABILITY: TRANSPORTATION INSECURITY: IN THE PAST 12 MONTHS, HAS LACK OF TRANSPORTATION KEPT YOU FROM MEDICAL APPOINTMENTS OR FROM GETTING MEDICATIONS?: NO

## 2024-06-02 SDOH — ECONOMIC STABILITY: FOOD INSECURITY

## 2024-06-02 SDOH — SOCIAL STABILITY: SOCIAL INSECURITY: HAS ANYONE EVER THREATENED TO HURT YOUR FAMILY OR YOUR PETS?: NO

## 2024-06-02 SDOH — ECONOMIC STABILITY: HOUSING INSECURITY: DO YOU FEEL UNSAFE GOING BACK TO THE PLACE WHERE YOU ARE LIVING?: NO

## 2024-06-02 SDOH — ECONOMIC STABILITY: FOOD INSECURITY: WITHIN THE PAST 12 MONTHS, THE FOOD YOU BOUGHT JUST DIDN’T LAST AND YOU DIDN’T HAVE MONEY TO GET MORE.: NEVER TRUE

## 2024-06-02 SDOH — SOCIAL STABILITY: SOCIAL INSECURITY: DO YOU FEEL ANYONE HAS EXPLOITED OR TAKEN ADVANTAGE OF YOU FINANCIALLY OR OF YOUR PERSONAL PROPERTY?: NO

## 2024-06-02 ASSESSMENT — ACTIVITIES OF DAILY LIVING (ADL): LACK_OF_TRANSPORTATION: NO

## 2024-06-02 ASSESSMENT — LIFESTYLE VARIABLES
HOW OFTEN DO YOU HAVE A DRINK CONTAINING ALCOHOL: NEVER
AUDIT-C TOTAL SCORE: 0
HOW OFTEN DO YOU HAVE 6 OR MORE DRINKS ON ONE OCCASION: NEVER
SKIP TO QUESTIONS 9-10: 1
AUDIT-C TOTAL SCORE: 0
HOW MANY STANDARD DRINKS CONTAINING ALCOHOL DO YOU HAVE ON A TYPICAL DAY: PATIENT DOES NOT DRINK

## 2024-06-02 ASSESSMENT — PATIENT HEALTH QUESTIONNAIRE - PHQ9
2. FEELING DOWN, DEPRESSED OR HOPELESS: NOT AT ALL
SUM OF ALL RESPONSES TO PHQ9 QUESTIONS 1 & 2: 0
1. LITTLE INTEREST OR PLEASURE IN DOING THINGS: NOT AT ALL

## 2024-06-02 ASSESSMENT — PAIN SCALES - GENERAL: PAINLEVEL_OUTOF10: 0 - NO PAIN

## 2024-06-02 NOTE — H&P
"Obstetrical Admission History and Physical       Expand All Collapse All       Subjective   Patient ID [id number]   Ephraim Diego is a 33 y.o.  at 39w3d with a working estimated date of delivery of 2024, by Ultrasound who presents for a routine prenatal visit. She denies vaginal bleeding, leakage of fluid, decreased fetal movements, or contractions.     Her pregnancy is complicated by:  BMI greater than 30.  Nonstress test reactive.  Hejl for induction of labor tomorrow.  Plan is oral Cytotec followed by Pitocin.  Epidural as needed.  Reviewed risks of fetal heart rate changes, tachysystole, need for emergent or operative delivery           Objective   Physical Exam:   Weight: 75.3 kg (166 lb)  Expected Total Weight Gain: Could not be calculated   Pregravid BMI: Could not be calculated  BP: 112/72                   Prenatal Labs  Urine Dip:  No results found for: \"KETONESU\", \"GLUCOSEUR\", \"LEUKOCYTESUR\"        Lab Results   Component Value Date     HGB 13.0 2024     HCT 39.3 2024     ABO B 11/15/2023     HEPBSAG Nonreactive 11/15/2023      No results found for: \"PAPPA\", \"AFP\", \"HCG\", \"ESTRIOL\", \"INHBA\"  No results found for: \"GLUF\", \"GLUT1\", \"DNDDRNP2MZ\", \"MEIWSBR9ZM\"     Imaging  The most recent ultrasound was performed on   with a study GA of   and EFW of  .           Assessment/Plan   Scheduled for induction of labor tomorrow.  Start with oral Cytotec  Continue prenatal vitamin.  Labs reviewed.  GBS taken.  Expected mode of delivery vaginal  Follow up in 1 week for a routine prenatal visit.                  Electronically signed by Javier Willis MD at 2024  3:12 PM    Attempted IOL last week, failed ripening after multiple doses of oral cytotec     Obstetrical History   OB History    Para Term  AB Living   2 0 0 0 0 0   SAB IAB Ectopic Multiple Live Births   0 0 0 0 0      # Outcome Date GA Lbr Eugenio/2nd Weight Sex Delivery Anes PTL Lv   2 Current            1           "       Past Medical History  Past Medical History:   Diagnosis Date    Abnormal karyotype 06/28/2023    Formatting of this note might be different from the original.   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    46,XX,inv(9)(p12q13) - normal population variant (polymorphism).    Infertility, female 07/03/2023        Past Surgical History   No past surgical history on file.    Social History  Social History     Tobacco Use    Smoking status: Never    Smokeless tobacco: Never   Substance Use Topics    Alcohol use: Not on file     Substance and Sexual Activity   Drug Use Not on file       Allergies  Patient has no known allergies.     Medications  (Not in a hospital admission)      Objective    Last Vitals  Temp Pulse Resp BP MAP O2 Sat                   Physical Examination  GENERAL: Examination reveals a well developed, well nourished, gravid female in no acute distress. She is alert and cooperative.    Lab Review  Lab Results   Component Value Date    WBC 6.3 05/29/2024    HGB 13.5 05/29/2024    HCT 40.2 05/29/2024     (L) 05/29/2024     2nd attempt at IOL  For oral cytotec, followed by pitocin. Epidural prn

## 2024-06-03 ENCOUNTER — ANESTHESIA EVENT (OUTPATIENT)
Dept: OBSTETRICS AND GYNECOLOGY | Facility: HOSPITAL | Age: 34
End: 2024-06-03
Payer: COMMERCIAL

## 2024-06-03 ENCOUNTER — ANESTHESIA (OUTPATIENT)
Dept: OBSTETRICS AND GYNECOLOGY | Facility: HOSPITAL | Age: 34
End: 2024-06-03
Payer: COMMERCIAL

## 2024-06-03 PROCEDURE — 4A1H7CZ MONITORING OF PRODUCTS OF CONCEPTION, CARDIAC RATE, VIA NATURAL OR ARTIFICIAL OPENING: ICD-10-PCS | Performed by: OBSTETRICS & GYNECOLOGY

## 2024-06-03 PROCEDURE — 59400 OBSTETRICAL CARE: CPT | Performed by: OBSTETRICS & GYNECOLOGY

## 2024-06-03 PROCEDURE — 7210000002 HC LABOR PER HOUR

## 2024-06-03 PROCEDURE — 2500000004 HC RX 250 GENERAL PHARMACY W/ HCPCS (ALT 636 FOR OP/ED): Performed by: OBSTETRICS & GYNECOLOGY

## 2024-06-03 PROCEDURE — 10H073Z INSERTION OF MONITORING ELECTRODE INTO PRODUCTS OF CONCEPTION, VIA NATURAL OR ARTIFICIAL OPENING: ICD-10-PCS | Performed by: OBSTETRICS & GYNECOLOGY

## 2024-06-03 PROCEDURE — 3700000002 HC GENERAL ANESTHESIA TIME - EACH INCREMENTAL 1 MINUTE: Performed by: NURSE ANESTHETIST, CERTIFIED REGISTERED

## 2024-06-03 PROCEDURE — 3E033VJ INTRODUCTION OF OTHER HORMONE INTO PERIPHERAL VEIN, PERCUTANEOUS APPROACH: ICD-10-PCS

## 2024-06-03 PROCEDURE — 51702 INSERT TEMP BLADDER CATH: CPT

## 2024-06-03 PROCEDURE — 3700000001 HC GENERAL ANESTHESIA TIME - INITIAL BASE CHARGE: Performed by: NURSE ANESTHETIST, CERTIFIED REGISTERED

## 2024-06-03 PROCEDURE — 3700000014 EPIDURAL BLOCK: Performed by: NURSE ANESTHETIST, CERTIFIED REGISTERED

## 2024-06-03 PROCEDURE — 2500000004 HC RX 250 GENERAL PHARMACY W/ HCPCS (ALT 636 FOR OP/ED): Performed by: NURSE ANESTHETIST, CERTIFIED REGISTERED

## 2024-06-03 PROCEDURE — 1120000001 HC OB PRIVATE ROOM DAILY

## 2024-06-03 PROCEDURE — 7100000016 HC LABOR RECOVERY PER HOUR

## 2024-06-03 PROCEDURE — 59050 FETAL MONITOR W/REPORT: CPT

## 2024-06-03 RX ORDER — CARBOPROST TROMETHAMINE 250 UG/ML
250 INJECTION, SOLUTION INTRAMUSCULAR ONCE AS NEEDED
Status: DISCONTINUED | OUTPATIENT
Start: 2024-06-03 | End: 2024-06-05 | Stop reason: HOSPADM

## 2024-06-03 RX ORDER — IBUPROFEN 600 MG/1
600 TABLET ORAL EVERY 6 HOURS
Status: DISCONTINUED | OUTPATIENT
Start: 2024-06-03 | End: 2024-06-05 | Stop reason: HOSPADM

## 2024-06-03 RX ORDER — HYDRALAZINE HYDROCHLORIDE 20 MG/ML
5 INJECTION INTRAMUSCULAR; INTRAVENOUS ONCE AS NEEDED
Status: DISCONTINUED | OUTPATIENT
Start: 2024-06-03 | End: 2024-06-05 | Stop reason: HOSPADM

## 2024-06-03 RX ORDER — LIDOCAINE 560 MG/1
1 PATCH PERCUTANEOUS; TOPICAL; TRANSDERMAL
Status: DISCONTINUED | OUTPATIENT
Start: 2024-06-03 | End: 2024-06-05 | Stop reason: HOSPADM

## 2024-06-03 RX ORDER — ONDANSETRON HYDROCHLORIDE 2 MG/ML
4 INJECTION, SOLUTION INTRAVENOUS EVERY 6 HOURS PRN
Status: DISCONTINUED | OUTPATIENT
Start: 2024-06-03 | End: 2024-06-05 | Stop reason: HOSPADM

## 2024-06-03 RX ORDER — ONDANSETRON 4 MG/1
4 TABLET, FILM COATED ORAL EVERY 6 HOURS PRN
Status: DISCONTINUED | OUTPATIENT
Start: 2024-06-03 | End: 2024-06-05 | Stop reason: HOSPADM

## 2024-06-03 RX ORDER — BUPIVACAINE HYDROCHLORIDE 2.5 MG/ML
INJECTION, SOLUTION EPIDURAL; INFILTRATION; INTRACAUDAL AS NEEDED
Status: DISCONTINUED | OUTPATIENT
Start: 2024-06-03 | End: 2024-06-03

## 2024-06-03 RX ORDER — FENTANYL/ROPIVACAINE/NS/PF 2MCG/ML-.2
0-25 PLASTIC BAG, INJECTION (ML) INJECTION CONTINUOUS
Status: DISCONTINUED | OUTPATIENT
Start: 2024-06-03 | End: 2024-06-05 | Stop reason: HOSPADM

## 2024-06-03 RX ORDER — DIPHENHYDRAMINE HCL 25 MG
25 CAPSULE ORAL EVERY 6 HOURS PRN
Status: DISCONTINUED | OUTPATIENT
Start: 2024-06-03 | End: 2024-06-05 | Stop reason: HOSPADM

## 2024-06-03 RX ORDER — OXYTOCIN 10 [USP'U]/ML
10 INJECTION, SOLUTION INTRAMUSCULAR; INTRAVENOUS ONCE AS NEEDED
Status: DISCONTINUED | OUTPATIENT
Start: 2024-06-03 | End: 2024-06-05 | Stop reason: HOSPADM

## 2024-06-03 RX ORDER — LABETALOL HYDROCHLORIDE 5 MG/ML
20 INJECTION, SOLUTION INTRAVENOUS ONCE AS NEEDED
Status: DISCONTINUED | OUTPATIENT
Start: 2024-06-03 | End: 2024-06-05 | Stop reason: HOSPADM

## 2024-06-03 RX ORDER — ACETAMINOPHEN 325 MG/1
975 TABLET ORAL EVERY 6 HOURS
Status: DISCONTINUED | OUTPATIENT
Start: 2024-06-03 | End: 2024-06-05 | Stop reason: HOSPADM

## 2024-06-03 RX ORDER — SIMETHICONE 80 MG
80 TABLET,CHEWABLE ORAL 4 TIMES DAILY PRN
Status: DISCONTINUED | OUTPATIENT
Start: 2024-06-03 | End: 2024-06-05 | Stop reason: HOSPADM

## 2024-06-03 RX ORDER — TRANEXAMIC ACID 100 MG/ML
1000 INJECTION, SOLUTION INTRAVENOUS ONCE AS NEEDED
Status: DISCONTINUED | OUTPATIENT
Start: 2024-06-03 | End: 2024-06-05 | Stop reason: HOSPADM

## 2024-06-03 RX ORDER — METHYLERGONOVINE MALEATE 0.2 MG/ML
0.2 INJECTION INTRAVENOUS ONCE AS NEEDED
Status: DISCONTINUED | OUTPATIENT
Start: 2024-06-03 | End: 2024-06-05 | Stop reason: HOSPADM

## 2024-06-03 RX ORDER — DIPHENHYDRAMINE HYDROCHLORIDE 50 MG/ML
25 INJECTION INTRAMUSCULAR; INTRAVENOUS EVERY 6 HOURS PRN
Status: DISCONTINUED | OUTPATIENT
Start: 2024-06-03 | End: 2024-06-05 | Stop reason: HOSPADM

## 2024-06-03 RX ORDER — MISOPROSTOL 200 UG/1
800 TABLET ORAL ONCE AS NEEDED
Status: DISCONTINUED | OUTPATIENT
Start: 2024-06-03 | End: 2024-06-05 | Stop reason: HOSPADM

## 2024-06-03 RX ORDER — FENTANYL/ROPIVACAINE/NS/PF 2MCG/ML-.2
PLASTIC BAG, INJECTION (ML) INJECTION
Status: COMPLETED
Start: 2024-06-03 | End: 2024-06-03

## 2024-06-03 RX ORDER — LOPERAMIDE HYDROCHLORIDE 2 MG/1
4 CAPSULE ORAL EVERY 2 HOUR PRN
Status: DISCONTINUED | OUTPATIENT
Start: 2024-06-03 | End: 2024-06-05 | Stop reason: HOSPADM

## 2024-06-03 RX ORDER — NIFEDIPINE 10 MG/1
10 CAPSULE ORAL ONCE AS NEEDED
Status: DISCONTINUED | OUTPATIENT
Start: 2024-06-03 | End: 2024-06-05 | Stop reason: HOSPADM

## 2024-06-03 RX ORDER — BISACODYL 10 MG/1
10 SUPPOSITORY RECTAL DAILY PRN
Status: DISCONTINUED | OUTPATIENT
Start: 2024-06-03 | End: 2024-06-05 | Stop reason: HOSPADM

## 2024-06-03 RX ORDER — ADHESIVE BANDAGE
10 BANDAGE TOPICAL
Status: DISCONTINUED | OUTPATIENT
Start: 2024-06-03 | End: 2024-06-05 | Stop reason: HOSPADM

## 2024-06-03 RX ORDER — POLYETHYLENE GLYCOL 3350 17 G/17G
17 POWDER, FOR SOLUTION ORAL 2 TIMES DAILY PRN
Status: DISCONTINUED | OUTPATIENT
Start: 2024-06-03 | End: 2024-06-05 | Stop reason: HOSPADM

## 2024-06-03 RX ORDER — OXYTOCIN/0.9 % SODIUM CHLORIDE 30/500 ML
60 PLASTIC BAG, INJECTION (ML) INTRAVENOUS ONCE AS NEEDED
Status: DISCONTINUED | OUTPATIENT
Start: 2024-06-03 | End: 2024-06-05 | Stop reason: HOSPADM

## 2024-06-03 RX ADMIN — SODIUM CHLORIDE, SODIUM LACTATE, POTASSIUM CHLORIDE, AND CALCIUM CHLORIDE 500 ML: 600; 310; 30; 20 INJECTION, SOLUTION INTRAVENOUS at 03:52

## 2024-06-03 RX ADMIN — SODIUM CHLORIDE, POTASSIUM CHLORIDE, SODIUM LACTATE AND CALCIUM CHLORIDE 125 ML/HR: 600; 310; 30; 20 INJECTION, SOLUTION INTRAVENOUS at 04:49

## 2024-06-03 RX ADMIN — BUPIVACAINE HYDROCHLORIDE 6 ML: 2.5 INJECTION, SOLUTION EPIDURAL; INFILTRATION; INTRACAUDAL; PERINEURAL at 05:50

## 2024-06-03 RX ADMIN — Medication 2 MILLI-UNITS/MIN: at 19:45

## 2024-06-03 RX ADMIN — BUPIVACAINE HYDROCHLORIDE 3 ML: 2.5 INJECTION, SOLUTION EPIDURAL; INFILTRATION; INTRACAUDAL; PERINEURAL at 04:44

## 2024-06-03 RX ADMIN — SODIUM CHLORIDE, POTASSIUM CHLORIDE, SODIUM LACTATE AND CALCIUM CHLORIDE 125 ML/HR: 600; 310; 30; 20 INJECTION, SOLUTION INTRAVENOUS at 19:26

## 2024-06-03 RX ADMIN — Medication 12 ML/HR: at 12:27

## 2024-06-03 RX ADMIN — SODIUM CHLORIDE, POTASSIUM CHLORIDE, SODIUM LACTATE AND CALCIUM CHLORIDE 125 ML/HR: 600; 310; 30; 20 INJECTION, SOLUTION INTRAVENOUS at 12:28

## 2024-06-03 RX ADMIN — BUPIVACAINE HYDROCHLORIDE 3 ML: 2.5 INJECTION, SOLUTION EPIDURAL; INFILTRATION; INTRACAUDAL; PERINEURAL at 04:46

## 2024-06-03 RX ADMIN — Medication 2 MILLI-UNITS/MIN: at 09:26

## 2024-06-03 RX ADMIN — Medication 12 ML/HR: at 04:49

## 2024-06-03 ASSESSMENT — PAIN SCALES - GENERAL
PAINLEVEL_OUTOF10: 5 - MODERATE PAIN
PAINLEVEL_OUTOF10: 5 - MODERATE PAIN
PAINLEVEL_OUTOF10: 7

## 2024-06-03 NOTE — PROGRESS NOTES
Examined around 630.  Fully dilated but was having left-sided pain.  Gave her the option of starting to push versus redoing the epidural.    Has been pushing.  Short duration of fetal bradycardia.  Fetal heart rate was similar to maternal heart rate so uncertain if it was an error.  Fetal scalp electrode placed and currently reactive tracing with moderate variability and accelerations.    Pitocin was discontinued.  Nurses are changing shifts and will take a break for few minutes and then resume pushing.    Vaginal exam at spines +1-2.  Maternal expulsive efforts are excellent

## 2024-06-03 NOTE — PROGRESS NOTES
Intrapartum Progress Note    Subjective   Comfortable with epidural.   Objective   Last Vitals:  /78 Temp 36.9 °C (98.4 °F) Pulse 84     Tracing: category 1  Ctx: every 1.5-3 minutes with multiphasics    VE: 4 cm, 100 %, -1 vertex, confirmed ruptured   Pitocin 18 mu    A/P : Active phase of labor    Patient was seen and staffed with Dr. Odalys Solorio MD   Resident, PGY1

## 2024-06-03 NOTE — ANESTHESIA PREPROCEDURE EVALUATION
Patient: Ephraim Diego    Evaluation Method: In-person visit    Procedure Information    Date: 06/03/24  Procedure: Labor Analgesia         Relevant Problems   No relevant active problems       Clinical information reviewed:    Allergies  Meds               NPO Detail:  No data recorded     OB/Gyn Evaluation    Present Pregnancy    Patient is pregnant now.   Obstetric History                Physical Exam    Airway  Mallampati: II  TM distance: >3 FB  Neck ROM: full     Cardiovascular   Rate: normal     Dental - normal exam     Pulmonary    Abdominal            Anesthesia Plan    History of general anesthesia?: no  History of complications of general anesthesia?: no    ASA 2     epidural     Anesthetic plan and risks discussed with patient.    Plan discussed with CRNA.

## 2024-06-03 NOTE — PROGRESS NOTES
Intrapartum Progress Note    Subjective   Comfortable    Objective   Last Vitals:  BP (!) 144/81 Temp 36.9 °C (98.4 °F) Pulse 87  mild range BP.    Tracing: cat 1 tracing  Ctx: every 3-4 mins    VE: 9cm per nursing.     Tasia Morrow MD

## 2024-06-03 NOTE — ANESTHESIA PROCEDURE NOTES
Epidural Block    Patient location during procedure: OB  Start time: 6/3/2024 4:31 AM  End time: 6/3/2024 4:39 AM  Reason for block: labor analgesia  Staffing  Performed: CRNA   Authorized by: JUANY Rendon    Performed by: JUANY Rendon    Preanesthetic Checklist  Completed: patient identified, IV checked, risks and benefits discussed, surgical consent, pre-op evaluation, timeout performed and sterile techniques followed  Block Timeout  RN/Licensed healthcare professional reads aloud to the Anesthesia provider and entire team: Patient identity, procedure with side and site, patient position, and as applicable the availability of implants/special equipment/special requirements.  Patient on coagulant treatment: no  Timeout performed at: 6/3/2024 4:30 AM  Block Placement  Patient position: sitting  Prep: ChloraPrep  Sterility prep: drape, gloves and mask  Sedation level: no sedation  Patient monitoring: blood pressure, continuous pulse oximetry and heart rate  Approach: midline  Local numbing: lidocaine 1% to skin and subcutaneous tissues  Vertebral space: lumbar  Lumbar location: L4-L5  Epidural  Loss of resistance technique: saline  Guidance: landmark technique        Needle  Needle type: Tuohy   Needle gauge: 18  Catheter type: multi-orifice  Catheter size: 20 G  Catheter at skin depth: 12 cm  Catheter securement method: clear occlusive dressing    Test dose: lidocaine 1.5% with epinephrine 1-to-200,000  Test dose: lidocaine 1.5% with epinephrine 1-to-200,000  Test dose result: no positive test dose            Assessment  Number of attempts: 1  Events: no positive test dose  Procedure assessment: patient tolerated procedure well with no immediate complications

## 2024-06-03 NOTE — PROGRESS NOTES
39  1/2  weeks   for   iol    cx   -/-2/per   nurse   plan   po   cytotec  this   pm   , pitocin  in  am

## 2024-06-03 NOTE — SIGNIFICANT EVENT
Pt due for cytotec, patient rating pain a 5 to 6 out of 10 and stating she feels as if she is ngoc more than tracing is picking up. Monitor re- adjusted. Cytotec held due to patients pain level. Will continue to monitor contractions and labor.

## 2024-06-04 PROCEDURE — 99232 SBSQ HOSP IP/OBS MODERATE 35: CPT | Performed by: OBSTETRICS & GYNECOLOGY

## 2024-06-04 PROCEDURE — 51701 INSERT BLADDER CATHETER: CPT

## 2024-06-04 PROCEDURE — 2500000004 HC RX 250 GENERAL PHARMACY W/ HCPCS (ALT 636 FOR OP/ED): Performed by: OBSTETRICS & GYNECOLOGY

## 2024-06-04 PROCEDURE — 1120000001 HC OB PRIVATE ROOM DAILY

## 2024-06-04 PROCEDURE — 2500000001 HC RX 250 WO HCPCS SELF ADMINISTERED DRUGS (ALT 637 FOR MEDICARE OP): Performed by: OBSTETRICS & GYNECOLOGY

## 2024-06-04 RX ADMIN — ACETAMINOPHEN 975 MG: 325 TABLET ORAL at 13:16

## 2024-06-04 RX ADMIN — ACETAMINOPHEN 975 MG: 325 TABLET ORAL at 07:18

## 2024-06-04 RX ADMIN — ENOXAPARIN SODIUM 40 MG: 40 INJECTION SUBCUTANEOUS at 08:23

## 2024-06-04 RX ADMIN — IBUPROFEN 600 MG: 600 TABLET, FILM COATED ORAL at 00:52

## 2024-06-04 RX ADMIN — IBUPROFEN 600 MG: 600 TABLET, FILM COATED ORAL at 13:16

## 2024-06-04 RX ADMIN — POLYETHYLENE GLYCOL 3350 17 G: 17 POWDER, FOR SOLUTION ORAL at 20:36

## 2024-06-04 RX ADMIN — IBUPROFEN 600 MG: 600 TABLET, FILM COATED ORAL at 20:36

## 2024-06-04 RX ADMIN — ACETAMINOPHEN 975 MG: 325 TABLET ORAL at 20:35

## 2024-06-04 RX ADMIN — IBUPROFEN 600 MG: 600 TABLET, FILM COATED ORAL at 07:18

## 2024-06-04 RX ADMIN — ACETAMINOPHEN 975 MG: 325 TABLET ORAL at 00:52

## 2024-06-04 ASSESSMENT — PAIN SCALES - GENERAL
PAINLEVEL_OUTOF10: 3
PAINLEVEL_OUTOF10: 2
PAIN_LEVEL: 2
PAINLEVEL_OUTOF10: 5 - MODERATE PAIN
PAINLEVEL_OUTOF10: 2

## 2024-06-04 ASSESSMENT — PAIN DESCRIPTION - LOCATION: LOCATION: ABDOMEN

## 2024-06-04 ASSESSMENT — ACTIVITIES OF DAILY LIVING (ADL): LACK_OF_TRANSPORTATION: NO

## 2024-06-04 ASSESSMENT — PAIN - FUNCTIONAL ASSESSMENT: PAIN_FUNCTIONAL_ASSESSMENT: 0-10

## 2024-06-04 NOTE — L&D DELIVERY NOTE
OB Delivery Note  6/3/2024  Ephraim Diego  33 y.o.   Vaginal, Vacuum (Extractor)        Gestational Age: 40w2d  /Para:   Quantitative Blood Loss: Admission to Discharge: 0 mL (2024  8:12 PM - 6/3/2024  9:02 PM)    Annie Diego [[id number]]      Labor Events    Rupture date/time: 6/3/2024 0200  Rupture type: Spontaneous  Fluid color: Clear  Fluid odor: None  Complications: None       Labor Event Times    Dilation complete date/time: 6/3/2024 1815       Placenta    Placenta delivery date/time: 6/3/2024 2045  Placenta removal: Spontaneous  Placenta appearance: Intact       Cord    Number of loops: 1  Delayed cord clamping?: Yes  Cord blood disposition: Lab  Gases sent?: No       Anesthesia    Method: Epidural       Operative Delivery    Forceps attempted?: No  Vacuum extractor attempted?: Yes  Vacuum indications: Fetal Heart Rate or Rhythm Abnormality  Vacuum type: MityVac (bell)  Vacuum application location: Outlet  First attempt time vacuum applied: 6/3/2024 20:42:57  First attempt time vacuum removed: 6/3/2024 20:43:14  Number of pop offs: 0  Number of pulls with vacuum: 1       Shoulder Dystocia    Shoulder dystocia present?: No        Delivery    Birth date/time: 6/3/2024 20:43:00  Delivery type: Vaginal, Vacuum (Extractor)  Complications: None       Resuscitation    Method: Tactile stimulation, Suctioning       Apgars    Living status: Living  Apgar Component Scores:  1 min.:  5 min.:  10 min.:  15 min.:  20 min.:    Skin color:  0  0  1      Heart rate:  2  2  2      Reflex irritability:  2  2  2      Muscle tone:  2  2  2      Respiratory effort:  2  2  2      Total:  8  8  9      Apgars assigned by: ERON VEGA RN/JOAQUINA ARMSTRONG RN       Delivery Providers    Delivering clinician: Javier Willis MD   Provider Role    Halie Pérez RN Delivery Nurse    Lynne Vega RN Nursery Nurse     Resident    Halie Armstrong RN Nursery Nurse                 Javier Willis MD

## 2024-06-04 NOTE — SIGNIFICANT EVENT
MD at bedside to review tracing and attend delivery. MD requesting vaccume. 2 RN's present to baby nurse/ assist with delivery.

## 2024-06-04 NOTE — ANESTHESIA POSTPROCEDURE EVALUATION
Patient: Ephraim Diego    Procedure Summary       Date: 06/03/24 Room / Location:     Anesthesia Start: 0431 Anesthesia Stop: 2043    Procedure: Labor Analgesia Diagnosis:     Scheduled Providers:  Responsible Provider: JUANY Cohen    Anesthesia Type: epidural ASA Status: 2            Anesthesia Type: epidural    Vitals Value Taken Time   /63 06/04/24 0559   Temp 36 06/04/24 0559   Pulse 80 06/04/24 0559   Resp 16 06/04/24 0559   SpO2 96 06/04/24 0559       Anesthesia Post Evaluation    Patient location during evaluation: bedside  Patient participation: complete - patient participated  Level of consciousness: awake and alert  Pain score: 2  Pain management: adequate  Airway patency: patent  Cardiovascular status: acceptable  Respiratory status: acceptable  Hydration status: acceptable  Postoperative Nausea and Vomiting: none      No notable events documented.

## 2024-06-04 NOTE — LACTATION NOTE
This note was copied from a baby's chart.  Lactation Consultant Note  Lactation Consultation  Reason for Consult: Follow-up assessment  Consultant Name: ERON Marcus RN, CBS    Maternal Information       Maternal Assessment       Infant Assessment  Infant Behavior: Sleepy, Fussy    Feeding Assessment  Nutrition Source: Breastmilk  Feeding Method: Nursing at the breast  Feeding Position: Breast sandwich, Football/seated, Skin to skin, Nipple to nose, Mother demonstrates good positioning  Suck/Feeding: Sustained, Tactile stimulation needed, Baby led rhythmically, Nipple shield used, Coordinated suck/swallow/breathe  Latch Assessment: Minimal assistance is needed, Instructed on deep latch, Eagerly grasped on to latch, Deep latch obtained, Latch achieved after repeated attempts, Optimal angle of mouth opening, Comfortable with no pain, Sucking and swallowing, Sucks with long jaw movement, Bursts of sucking, swallowing, and rest, Flanged lips, Chin moves in rhythmic motion, Comfortable latch, Cries while latching    LATCH TOOL  Latch: Repeated attempts, hold nipple in mouth, stimulate to suck  Audible Swallowing: A few with stimulation  Type of Nipple: Everted (After stimulation)  Comfort (Breast/Nipple): Soft/non-tender  Hold (Positioning): Minimal assist, teach one side, mother does other, staff holds  LATCH Score: 7    Breast Pump  Pump: Hand expression, Manual pump  Frequency: Less than 3 times per day  Duration: Less than 15 minutes per session  Breast Shield Size and Type: 24 mm  Volume of Milk Production: 0    Other OB Lactation Tools  Lactation Tools: Nipple shields    Patient Follow-up  Inpatient Lactation Follow-up Needed : Yes    Other OB Lactation Documentation       Recommendations/Summary  LC called to bedside to assist with feed. Mother states  has been skin to skin for at least 30 minutes or more and she has attempted to latch him several times but states he is too sleepy and will not wake to feed.  Reviewed normal behavior of the  in the first 24 hours as well as normal  behavior after circumcision. Mother states understanding. Blood glucose just prior to this feed is 65. [place] in a deep sleep on mother's chest at this time, not showing any feeding cues. LC suggested hand expression at this time. Mother very receptive and able to return demonstrate proper HE but unable to obtain any drops of colostrum. Manual pump brought to bedside and reviewed with mother and mother unable to obtain drops. LC offered to try waking  at this time. Mother agreeable.  taken to  warmer and father changing his diaper. +stool.  more awake and alert but fussy and crying when positioned to the breast. Mother independently positioning  to the right breast in football hold with breast shaping. [place] reluctant despite correct positioning. Mother has been having difficulty latching  to the right breast. After several attempts, LC encouraged mother to try latching with small nipple shield. Mother asking LC to place the shield. Shield placed and  opening at a wide angle to obtain a latch. Deep latch obtained with active sucking and swallowing, lips flanged and long jaw movements.  requiring stimulation throughout but continuing to suck. Parents pleased. Encouraged mother to allow  to continue nursing until  appears satiated and then offer the left breast if  continues to show feeding cues. Mother states understanding. Encouraged mother to call should she need assistance with subsequent feeds. Mother agreeable. Offered ongoing assistance with breastfeeding. Mother denies further questions or concerns at this time.

## 2024-06-04 NOTE — PROGRESS NOTES
Postpartum Progress Note    Assessment/Plan   Ephraim Diego is a 33 y.o., , who delivered at 40w2d gestation and is now postpartum day 1.    Patient doing well  Continue routine care  Ibuprofen/Tylenol for pain management  Regular diet  Encouraged ambulation  SCD/Lovenox for DVT prophylaxis      Principal Problem:    Term pregnancy ([facility])    Pregnancy Problems (from 01/10/24 to present)       Problem Noted Resolved    Term pregnancy ([facility]) 2024 by Javier Willis MD No    Priority:  High            Hospital course: no complications      Subjective   Her pain is well controlled with current medications  She is not passing flatus  She is ambulating well  She is tolerating a Adult diet Regular  She reports no breast or nursing problems  She denies emotional concerns today   Her plan for contraception will be discussed at postpartum visit.         Objective   Allergies:   Patient has no known allergies.         Last Vitals:  Temp Pulse Resp BP MAP Pulse Ox   36 °C (96.8 °F) 77 16 114/60   96 %     Vitals Min/Max Last 24 Hours:  Temp  Min: 36 °C (96.8 °F)  Max: 37 °C (98.6 °F)  Pulse  Min: 60  Max: 118  Resp  Min: 16  Max: 18  BP  Min: 106/67  Max: 150/79    Intake/Output:     Intake/Output Summary (Last 24 hours) at 2024 0830  Last data filed at 2024 0030  Gross per 24 hour   Intake 2010.88 ml   Output 3295 ml   Net -1284.12 ml       Physical Exam:  General: Examination reveals a well developed, well nourished, female, in no acute distress. She is alert and cooperative.  Lungs: appropriate effort.  Fundus: firm, below umbilicus, and nontender.  Psychological: awake and alert; oriented to person, place, and time.    Lab Data:  Lab Results   Component Value Date    WBC 7.1 2024    HGB 13.4 2024    HCT 39.4 2024     (L) 2024   Lora Kilgore DO

## 2024-06-04 NOTE — SIGNIFICANT EVENT
Dr. Willis notififed patient has some red bleeding with pushing. Fetal heart rate tracing reviewed. Orders to re-start pitocin at this time.   no

## 2024-06-05 VITALS
DIASTOLIC BLOOD PRESSURE: 79 MMHG | HEIGHT: 61 IN | WEIGHT: 169.64 LBS | SYSTOLIC BLOOD PRESSURE: 119 MMHG | HEART RATE: 79 BPM | TEMPERATURE: 98.1 F | RESPIRATION RATE: 20 BRPM | BODY MASS INDEX: 32.03 KG/M2 | OXYGEN SATURATION: 98 %

## 2024-06-05 PROBLEM — Z34.90 TERM PREGNANCY (HHS-HCC): Status: RESOLVED | Noted: 2024-05-29 | Resolved: 2024-06-05

## 2024-06-05 PROCEDURE — 2500000004 HC RX 250 GENERAL PHARMACY W/ HCPCS (ALT 636 FOR OP/ED): Performed by: OBSTETRICS & GYNECOLOGY

## 2024-06-05 PROCEDURE — 2500000001 HC RX 250 WO HCPCS SELF ADMINISTERED DRUGS (ALT 637 FOR MEDICARE OP): Performed by: OBSTETRICS & GYNECOLOGY

## 2024-06-05 RX ORDER — ACETAMINOPHEN 325 MG/1
975 TABLET ORAL EVERY 6 HOURS
COMMUNITY
Start: 2024-06-05

## 2024-06-05 RX ORDER — IBUPROFEN 200 MG
600 TABLET ORAL EVERY 6 HOURS
COMMUNITY
Start: 2024-06-05

## 2024-06-05 RX ADMIN — ACETAMINOPHEN 975 MG: 325 TABLET ORAL at 15:03

## 2024-06-05 RX ADMIN — ACETAMINOPHEN 975 MG: 325 TABLET ORAL at 08:19

## 2024-06-05 RX ADMIN — IBUPROFEN 600 MG: 600 TABLET, FILM COATED ORAL at 15:03

## 2024-06-05 RX ADMIN — ENOXAPARIN SODIUM 40 MG: 40 INJECTION SUBCUTANEOUS at 08:19

## 2024-06-05 RX ADMIN — IBUPROFEN 600 MG: 600 TABLET, FILM COATED ORAL at 02:16

## 2024-06-05 RX ADMIN — ACETAMINOPHEN 975 MG: 325 TABLET ORAL at 02:16

## 2024-06-05 RX ADMIN — IBUPROFEN 600 MG: 600 TABLET, FILM COATED ORAL at 08:19

## 2024-06-05 RX ADMIN — POLYETHYLENE GLYCOL 3350 17 G: 17 POWDER, FOR SOLUTION ORAL at 15:10

## 2024-06-05 ASSESSMENT — PAIN - FUNCTIONAL ASSESSMENT: PAIN_FUNCTIONAL_ASSESSMENT: 0-10

## 2024-06-05 ASSESSMENT — PAIN SCALES - GENERAL
PAINLEVEL_OUTOF10: 5 - MODERATE PAIN
PAINLEVEL_OUTOF10: 4

## 2024-06-05 ASSESSMENT — ACTIVITIES OF DAILY LIVING (ADL): LACK_OF_TRANSPORTATION: NO

## 2024-06-05 ASSESSMENT — PAIN DESCRIPTION - DESCRIPTORS: DESCRIPTORS: CRAMPING;SORE

## 2024-06-05 NOTE — PROGRESS NOTES
Postpartum Progress Note    Assessment/Plan   Ephraim Diego is a 33 y.o.,  who delivered at 40w2d and is now postpartum day 2.    Doing well, no complications overnight.  Continue routine postpartum care.  Pain management with Tylenol and Motrin.  If baby okay for discharge today, then discharge pt home.  Follow up 6 weeks Dr. Willis.      Subjective     Feels well.  Breastfeeding, working with lactation.    Objective   Allergies:   Patient has no known allergies.    Last Vitals:  Temp Pulse Resp BP MAP Pulse Ox   36.8 °C (98.2 °F) 81 16 112/79   98 %     Vitals Min/Max Last 24 Hours:  Temp  Min: 36.7 °C (98.1 °F)  Max: 36.9 °C (98.4 °F)  Pulse  Min: 77  Max: 85  Resp  Min: 16  Max: 18  BP  Min: 109/73  Max: 123/77      Physical Exam:  General: no acute distress; she is awake and alert  Lungs: Normal respiratory effort  Abdomen: soft, non-tender, no distention  Fundus: firm  Extremities: no erythema, normal movements  Psychological: appropriate mood and behavior     Tasia Morrow MD

## 2024-06-05 NOTE — LACTATION NOTE
This note was copied from a baby's chart.  Lactation Consultant Note  Lactation Consultation       Maternal Information       Maternal Assessment       Infant Assessment       Feeding Assessment       LATCH TOOL       Breast Pump       Other OB Lactation Tools       Patient Follow-up       Other OB Lactation Documentation       Recommendations/Summary  LC asked to work with parents regarding supplementation of infant with 5% weight loss at 30 hours of life. Infant utilizing nipple shield at this time. LC in to speak with parents and parents are very receptive to donor breast milk via SNS. Supplies gathered and LC returned to room. Infant is skin to skin with mother at this time. Mother states that her shoulder is very sore from feeding infant in football hold. LC offered to show her cross cradle. Mother agrees. Infant placed in cross cradle and breast shaping reviewed. Mother states she is using the nipple shield because her nipples soften in infant's mouth. Reviewed breast edema following IV fluids and encouraged mother to try the bare breast. Mother able to roll her nipple and then brush infant's nose and lips with nipple. Infant opened mouth wide at that time and infant able to latch deeply and began sucking. SNS initiated at the breast. Infant's suck continued to be strong and was able to draw the 10ml of donor milk from the syringe himself. Infant continued to nurse on breast for several more minutes. Mother's nipple rounded after feeding. LC then encouraged mother to hand express. Mother states that she does not have any colostrum. LC reviewed proper hand expression technique and with mother's permission assisted mother in expressing several drops of colostrum. Mother super happy to see colostrum and practice good technique. Mother was also very pleased with the cross cradle hold and infant's deep latch without the shield placed. Large amount of education covered with parents at this time. Please see education  tab in mother's chart to see detailed list of topics discussed.    Parents given opportunity to ask questions. All questions answered at this time.     Update at 1430: LC called to bedside to assist parents with SNS. Father taking the lead with SNS at this time and LC available to help and answer questions. Mother able to latch infant to bare breast completely independently. Parents able to identify a good latch. Father able to initiate SNS at the breast and infant tolerated well. Infant fed well on both breasts and both nipples were rounded following the feed. Mother reported latch as comfortable.   All questions answered at this time.

## 2024-06-05 NOTE — LACTATION NOTE
This note was copied from a baby's chart.  Lactation Consultant Note  Lactation Consultation       Maternal Information       Maternal Assessment       Infant Assessment       Feeding Assessment       LATCH TOOL       Breast Pump       Other OB Lactation Tools       Patient Follow-up       Other OB Lactation Documentation       Recommendations/Summary  Parents feeding infant independently and confidently at this time. Infant nursing very well at the breast with frequent swallows. Infant content after feeding. Discharge education reviewed at this time. Your Guide to Postpartum and [place] [facility] booklet reviewed ( breastfeeding section only). Parents asking great questions. All questions answered at this time.

## 2024-06-05 NOTE — DISCHARGE SUMMARY
Discharge Summary    Admission Date: 2024  Discharge Date: 2024    Discharge Diagnosis  Term pregnancy ([facility])    Hospital Course  Delivery Date: 6/3/2024  8:43 PM   Delivery type: Vaginal, Vacuum (Extractor)    GA at delivery: 40w2d  Outcome: Living   Anesthesia during delivery: Epidural   Intrapartum complications: None   Feeding method: Breastfeeding Status: Unknown     Procedures:      Pt presented post dates for second attempt at IOL. S/p vacuum assist vaginal delivery. Uncomplicated postpartum course.    Discharge Meds     Your medication list        START taking these medications        Instructions Last Dose Given Next Dose Due   ibuprofen 200 mg tablet      Take 3 tablets (600 mg) by mouth every 6 hours.              CONTINUE taking these medications        Instructions Last Dose Given Next Dose Due   PRENATAL 19 ORAL                     Where to Get Your Medications        You can get these medications from any pharmacy    You don't need a prescription for these medications  ibuprofen 200 mg tablet          Test Results Pending At Discharge  Pending Labs       No current pending labs.            Outpatient Follow-Up  No future appointments.      Tasia Morrow MD

## 2024-06-05 NOTE — LACTATION NOTE
This note was copied from a baby's chart.  Lactation Consultant Note  Lactation Consultation  Reason for Consult: Follow-up assessment  Consultant Name: ERON Marcus RN, CBS    Maternal Information       Maternal Assessment       Infant Assessment  Infant Behavior: Fussy, Sleepy, Feeding cues observed    Feeding Assessment  Nutrition Source: Breastmilk  Feeding Method: Nursing at the breast  Feeding Position: Breast sandwich, Cross - cradle, Football/seated, Skin to skin, Nipple to nose, Mother demonstrates good positioning  Suck/Feeding: Sustained, Coordinated suck/swallow/breathe, Baby led rhythmically, Tactile stimulation needed, Nipple shield used  Latch Assessment: Reluctant, Minimal assistance is needed, Instructed on deep latch, Cries while latching, Deep latch obtained, Latch achieved after repeated attempts, Mouth open/moves head side to side, Optimal angle of mouth opening, Comfortable with no pain, Sucking and swallowing, Sucks with long jaw movement, Bursts of sucking, swallowing, and rest, Flanged lips, Chin moves in rhythmic motion, Comfortable latch    LATCH TOOL  Latch: Repeated attempts, hold nipple in mouth, stimulate to suck  Audible Swallowing: A few with stimulation  Type of Nipple: Everted (After stimulation)  Comfort (Breast/Nipple): Soft/non-tender  Hold (Positioning): No assist from staff, mother able to position/hold infant  LATCH Score: 8    Breast Pump       Other OB Lactation Tools  Lactation Tools: Nipple shields    Patient Follow-up  Inpatient Lactation Follow-up Needed : Yes    Other OB Lactation Documentation       Recommendations/Summary  LC called to bedside per parent's request. Mother states she has been attempting to latch  for the last hour and a half and is unable to get  to begin sucking, despite stimulation. Mother states she has continued skin to skin between latching attempts and has also tried hand expression and using the manual pump. LC to assist.   asleep on mother's chest at this time. LC offered to take  to bassinet to help wake .  fussy with position changes and easily waking. LC assisted mother to position  to the left breast in football hold. Mother positioning well independently, aligning  belly to belly and nipple to nose.  opening mouth at a wide angle and mother bringing  to the breast, angling the nipple toward the roof of the mouth but  begins to cry with each attempt and pushing away from the breast. LC encouraged mother to try with the nipple shield at this time due to the length of time since the last feed. Mother agreeable. Mother requesting LC to assist with shield placement. Mother attempting to latch  again but  became inconsolable. LC offered to assist with soothing  before changing positions. After several minutes,  calmed by sucking on LC's gloved finger. LC then placed  back with mother in cross cradle hold. Mother positioning  to the left breast in cross cradle with breast shaping.  becoming fussy and turning his head back and forth before latching on. Deep latch obtained with active sucking and swallowing, lips flanged and long jaw movements. Mother states latch is comfortable and states she is very pleased.  continued to nurse for approximately 5 minutes before unlatching and was reluctant to re-latch in the same position. Mother repositioning  to the left breast in football hold with breast shaping. Bohannon eager and deep latch obtained. Parents state relief that  is feeding.     Reviewed that  may still have discomfort from his circumcision and encouraged continued skin to skin. Cluster feeding reviewed and on demand feeding encouraged. Mother states understanding. Bohannon to have glucose reading after this feed.  continuing to nurse at that time. Offered ongoing assistance with breastfeeding.  Mother denies further questions or concerns at this time.

## 2024-06-07 ENCOUNTER — LACTATION CONSULT (OUTPATIENT)
Dept: LACTATION | Facility: HOSPITAL | Age: 34
End: 2024-06-07
Payer: COMMERCIAL

## 2024-06-07 DIAGNOSIS — O92.70 LACTATION DISORDER (HHS-HCC): Primary | ICD-10-CM

## 2024-06-07 PROCEDURE — 99211 OFF/OP EST MAY X REQ PHY/QHP: CPT | Performed by: FAMILY MEDICINE

## 2024-06-07 NOTE — PROGRESS NOTES
Lactation Counseling Note    Subjective:    Ephraim Diego is a 33 y.o. patient referred for lactation counseling. She is here today due to delayed milk production, Frequent night nursing, and Breast pumping concerns/issues. She was referred by her  self .     OB HISTORY:   Healthcare Provider: OB/GYN Javier Willis  /Para: : 3  Para: 1  Weeks Gestation: 40  Mode of Delivery: Normal vaginal route  [place] Information: Baby's Name: Patricia Diego  : [birth year]  Place of Birth: [facility]  Skin to skin contact: First hour  Birth parameters: SGA  Birth weight: 6 lb 7 oz  Discharge weight: lost 5-7% per parents  Complications: One low blood sugar. No OGG needed.     Objective:    BREASTFEEDING ASSESSMENT:   Physical Exam    Baby Name: Patricia  Breast Assessment: Medium, Symmetrical, Soft, Warm, and Compressible  Nipple Assessment/Stage: intact, erect, and rounded after feeding mother reports occasional discomfort with initial latch  Areola: Normal  Feeding Position: breast sandwich, cross - cradle, skin to skin, nipple to nose, mother demonstrates good positioning, and baby's head too high over breast  Latch: too sleepy, minimal assistance is needed, deep latch obtained, latch achieved, comfortable with no pain, flanged lips, and comfortable latch  Suck/Feeding: sustained and tactile stimulation needed  Alternative Feeding Methods: nursing at the breast  Feeding and Cleaning instructions reviewed for: Supplemental nursing system and Paced bottle  Infant Feeding Method: Breast milk only  Parents have occasionally supplemented with DHM  Infant Behavior: sleepy and fussy  Infant Information: Jaundice  Mucous Membranes: moist  Wet diaper in office  Breast Pain: Pain scale 0-10 (10 most pain): 0  Nipple Pain: Pain scale 0-10 (10 most pain): 0  Weight: Reported pediatrician visit weight: 5 lb 14 oz  Date of pediatrician weight: 24  Weight before feedin gm  Weight after feedin gm  Amount of  breast milk transferred: 0 ml  Number of voids in the last 24 hours: 3  Number of stools in the last 24 hours: 0    PATIENT DISCUSSION SUMMARY:  Mother and 4 day old infant seen for concerns of delayed milk coming in, weight loss and jaundice. Mother reports that infant is nursing comfortably and very frequently but feels that infant is not getting anything. Parents have been intermittently giving DHM supplementaton via SNS. When supplementation is given parents report that infant is swallowing frequently and is satisfied after feedings but otherwise no swallows are noted and infant wanting to nurse frequently.     Infant still losing weight from birth weight. Infant alert with moist mucous membranes.  Wet diaper in office. Infants bilirubin levels are being monitored closely and infant is sleepy at this feeding. Eventually infant achieved deep latch and was active at the breast for about 10 minutes before falling asleep (infant fed an hour and a half prior to appointment). Mothers nipple rounded. Infant did not transfer any milk. Had mother hand express and a drop of colostrum was expressed. Infant content in fathers arms. Encouraged parents to supplement infant as soon as they get home with formula if mother does not have pumped milk. Due to infant not transferring milk and sleepy at this feed,  encouraged mother to pump. Mother pumped with Spectra pump for 20 minutes and had zero output. Discussed increasing vacuum to comfort, use of massage and compression, and hand expression after pumping to improve breast emptying.      Plan:  Feed at least 8-12 times daily, both breasts for as long as infant is actively sucking and swallowing.  Use massage and compression to aid transfer.  If infant not sustaining latch with good sucks and audible swallows, mother to pump both breasts at same time for 15-20 minutes, using massage and compression, with hand expression after to fully drain breast. Feed expressed milk to  "infant until satisfied via SNS or paced bottle. If mothers output is too little, parents to use ready to feed formula.       Will f/u with lactation on 6/14 as needed and with pediatrician on 6/10/24.    Denies questions.  LACTATION PROBLEMS AND STRATEGIES:  Baby slow weight gain: Baby needs to latch deeply to breast  Do not wait until baby cries to feed. Watch for feeding cues-baby actively moving, mouth open, sucking, eye movement  Follow pediatrician's recommendations on use of supplements  Increase number of feedings per day. Nurse at least 10-12 times per day  Keep all sucking at the breast, avoid use of soothers (pacifiers)  Offer both breasts each feeding  See physician to rule out medical problem  Use breast compression if baby does not suckle actively  Use strategies to boost milk supply. See PI-162 breastfeeding: Tips to Increase Milk Supply  Low milk supply: Add extra feedings   Allow unrestricted breastfeeding the first few days per week  Check efficiency/comfort of pump and fit of pump breast flange  Check for pregnancy and endocrine levels  Count wet and soiled diapers. By the 5th day, 6 or more wet diapers and at least 3-4 stools  Do not go long periods (greater than 5 hours) without feeding or pumping  Finish one breast entirely before offering the second  Follow physician instructions regarding supplementation  Gave PI sheet # 162 \"Breastfeeding: Tips to Increase Your Milk Supply\"   Have the baby weighed. Weight gain during the first 6 months is 4 to 8 ounces per week  If baby is sleepy, wake for feedings  If baby is very hungry, try supplementing with a little EBM prior to putting baby to breast  Increase amount of time spent \"skin to skin\" with baby  Listen to music or guided relaxation during pumping  Make sure the baby takes the breast deeply and transfers milk effectively  Nurse a minimum of 10 -12 times per day. Watch for feeding cues  Pump after feedings as directed to increase milk " "supply  [facility] grade double electrical breast pump  Talk to your health provider concerning the use of herbs or medication to increase milk supply  The baby may be experiencing a growth spurt. Feed frequently until milk supply increases  Use alternate massage during feedings  Use breast massage during pumping and hand expression after pumping  Warm pump flange or use warm compress on breast when pumping  Do not wait for baby to cry before feeding. Watch for feeding cues-opens mouth, sucking and body movements  low weight gain: Be sure baby is latching deeply to breast  Do not use a time limit at breast  Eliminate pacifiers and other soothers such as swings  Give supplements if necessary, follow progress closely  Increase frequency of nursing to10-12 times per day  Increase time spent skin to skin with baby  Monitor wet diapers and bowel movements  See physician to rule out illnesses  Try switching nursing techniques  Under 6 months of age, a normal weight gain is 4-6 ounces per week and 3-5 ounces per week after 6 months of age  Use breast massage and breast compression  Pumping pointers: Air dry nipples, express milk and rub in or use an approved nipple cream after expressing., Allow 10  to 15 minutes per breast for single pumping, and 10 to 15 minutes total for double pumping., Apply heat to breasts before pumping., Choose a familiar comfortable place to express milk., If nipples are sore use less pressure and pump more frequently for shorter times., If nipples are sore, center nipple in flange, try a larger flange, or try a different pump., Listen to a tape of baby while pumping., Look at a photo of baby wile expressing., Massage breasts before and during pumping., Minimize distractions., Relax for 5 minutes before pumping., Stimulate the nipples and hand express a few drops before pumping., Switch breasts when flow lessens., and Use pumping bra/band for \"hands free\" pumping.  PATIENT INSTRUCTION HANDOUTS " GIVEN:   Foods that promote good milk production  Breastfeeding: Tips to increase your milk supply (PI# 162)  LACTATION EDUCATION:  Waking Techniques, Correct Positioning, Correct Latch On, and Demo use of Pump

## 2024-06-14 ENCOUNTER — LACTATION CONSULT (OUTPATIENT)
Dept: LACTATION | Facility: HOSPITAL | Age: 34
End: 2024-06-14
Payer: COMMERCIAL

## 2024-06-14 DIAGNOSIS — O92.70 LACTATION DISORDER (HHS-HCC): Primary | ICD-10-CM

## 2024-06-14 PROCEDURE — 99211 OFF/OP EST MAY X REQ PHY/QHP: CPT | Performed by: FAMILY MEDICINE

## 2024-06-14 NOTE — PROGRESS NOTES
Lactation Counseling Note    Subjective:    Ephraim Diego is a 33 y.o. patient referred for lactation counseling. She is here today due to  delayed lactation, latch and weight check . She was referred by her  self .     OB HISTORY:   Vaginal delivery on 6/3/24 at . SGA infant at 6lbs 7 oz at 40 weeks of gestation. No significant complications in hospital. See previous note for more detailed information.     Objective:    BREASTFEEDING ASSESSMENT:   Physical Exam    Baby Name: Patricia Diego  Breast Assessment: Medium, Symmetrical, Filling, Soft, Warm, and Compressible  Nipple Assessment/Stage: intact, erect, and rounded after feeding mother denies pain or breakdown  Areola: Normal  Feeding Position: breast sandwich, cross - cradle, skin to skin, both sides, and mother demonstrates good positioning  Latch: minimal assistance is needed, instructed on deep latch, eagerly grasped on to latch, areolar attachment, sucking and swallowing, chin and lower lip contact breast first, flanged lips, comfortable latch, and frequent audible swallows  Suck/Feeding: sustained, audible swallowing, and content after feeding  Infant Feeding Method: Breast milk only  Infant Behavior: awake, readiness to feed, feeding cues observed, rooting response, sucking, and content after feeding  Infant Information: Full term infant, 11 days of life  Breast Pain: Pain scale 0-10 (10 most pain): 0  Nipple Pain: Pain scale 0-10 (10 most pain): 0  Weight: Reported pediatrician visit weight: 5 lb 13 oz  Date of pediatrician weight: 6/10/24  Weight before feedin gm  Weight after feedin gm  Amount of breast milk transferred: 50 ml  Number of voids in the last 24 hours: 8  Number of stools in the last 24 hours: 5    PATIENT DISCUSSION SUMMARY:  Mother and infant seen at 11 days of life for a follow up visit. Mothers milk is now in and easily expressible. Mother has been pumping and supplementing infant with bottles after feedings but  over the last day or so infant has been spitting up more and is content after feedings so they have decreased the amount of supplementation. Infant weight gain appropriate and output adequate at this time.     Mother was able to independently latch infant well. Infant quickly achieved deep latch with flanged lips and chin hitting breast first. Infant sleepy occasionally but responds well to tactile stimulation. Infant nursed well on both breasts in cross cradle hold. Nipples rounded after feedings. Infant content after feeding. Wet diaper in office.     Mother planning on pumping on the way home since a pumping session was previously observed. Mother did pump after the first morning feeding and expressed 60mls in 20 minutes with Spectra breast pump.     Plan: Mother to continue offering the breast every 2-3 hours and supplementing after feedings as hunger cues arise. Parents to continue monitoring for adequate output. Mother to plan on continuing to pump after day time feedings but will slowly decrease the amount of pumping sessions as infant is more exclusively at the breast. Parents are following up with pediatrician on Monday 6/17/24 and lactation as needed and desired.     All questions and concerns addressed.   LACTATION PROBLEMS AND STRATEGIES:  Latch check and weighted feed  PATIENT INSTRUCTION HANDOUTS GIVEN:   Given at previous consult  LACTATION EDUCATION:  Waking Techniques and Correct Latch On

## 2024-06-18 ENCOUNTER — TELEPHONE (OUTPATIENT)
Dept: OBSTETRICS AND GYNECOLOGY | Facility: HOSPITAL | Age: 34
End: 2024-06-18
Payer: COMMERCIAL

## 2024-06-18 NOTE — PROGRESS NOTES
Mother reports that infant is nursing well, comfortably and often. Mother states that infant had peds appointment 6/17/24 and infant is gaining weight but not up to birth weight so pediatrician would like a follow up weight on Thursday. Mother has been pumping four times a day to maintain supply but has not needed to supplement infant. Reviewed decreasing pumping sessions, when supplementation is needed, cluster feeding and signs of satiety. Mother denies any further questions or concerns. Mother to call lactation if desires more assistance or has any questions.

## 2024-07-15 ENCOUNTER — APPOINTMENT (OUTPATIENT)
Dept: OBSTETRICS AND GYNECOLOGY | Facility: CLINIC | Age: 34
End: 2024-07-15
Payer: COMMERCIAL

## 2024-07-15 VITALS — WEIGHT: 147 LBS | SYSTOLIC BLOOD PRESSURE: 118 MMHG | BODY MASS INDEX: 27.78 KG/M2 | DIASTOLIC BLOOD PRESSURE: 60 MMHG

## 2024-07-15 DIAGNOSIS — Z30.011 OCP (ORAL CONTRACEPTIVE PILLS) INITIATION: ICD-10-CM

## 2024-07-15 PROCEDURE — 0503F POSTPARTUM CARE VISIT: CPT | Performed by: MIDWIFE

## 2024-07-15 RX ORDER — NORETHINDRONE 0.35 MG/1
1 TABLET ORAL DAILY
Qty: 84 TABLET | Refills: 3 | Status: SHIPPED | OUTPATIENT
Start: 2024-07-15 | End: 2025-07-15

## 2024-07-15 NOTE — PROGRESS NOTES
Postpartum Progress Note    Assessment/Plan   Ephraim Diego is a 33 y.o., , who delivered at 40w2d gestation and is now postpartum day 42.    Pt is here today for PPV and is feeling well s/p Vacuum assisted vaginal birth of son Patricia 6/3/24 who she is breastfeeding.     Active Problems:  There are no active Hospital Problems.    Pregnancy Problems (from 01/10/24 to present)       Problem Noted Resolved    Term pregnancy ([facility]) 2024 by Javier Willis MD 2024 by Marsha Liu MD          Hospital course: no complications       Subjective   Her pain is well controlled with current medications  She is passing flatus  She is ambulating well  She is tolerating a No diet orders on file  She reports no breast or nursing problems  She denies emotional concerns today   Her plan for contraception is oral contraceptives     We discussed BCM options and pt elects to start POP at this time but will consider RTO for Mirena.    Objective   Allergies:   Patient has no known allergies.         Last Vitals:  Temp Pulse Resp BP MAP Pulse Ox         118/60         Vitals Min/Max Last 24 Hours:  @FLOWSTAT(6,8,9,5,[id number]:24::1)@    Intake/Output:   [unfilled]    Physical Exam:  General: Examination reveals a well developed, well nourished, female, in no acute distress. She is alert and cooperative.    Lab Data:  Labs in chart were reviewed.

## 2024-07-18 ENCOUNTER — HOSPITAL ENCOUNTER (EMERGENCY)
Facility: HOSPITAL | Age: 34
Discharge: HOME | End: 2024-07-18
Payer: COMMERCIAL

## 2024-07-18 VITALS
BODY MASS INDEX: 27.38 KG/M2 | SYSTOLIC BLOOD PRESSURE: 122 MMHG | WEIGHT: 145 LBS | OXYGEN SATURATION: 99 % | HEART RATE: 105 BPM | TEMPERATURE: 99.1 F | HEIGHT: 61 IN | DIASTOLIC BLOOD PRESSURE: 78 MMHG | RESPIRATION RATE: 16 BRPM

## 2024-07-18 DIAGNOSIS — N61.0 MASTITIS: Primary | ICD-10-CM

## 2024-07-18 PROCEDURE — 2500000001 HC RX 250 WO HCPCS SELF ADMINISTERED DRUGS (ALT 637 FOR MEDICARE OP)

## 2024-07-18 PROCEDURE — 99284 EMERGENCY DEPT VISIT MOD MDM: CPT

## 2024-07-18 RX ORDER — CEPHALEXIN 500 MG/1
500 CAPSULE ORAL 4 TIMES DAILY
Qty: 40 CAPSULE | Refills: 0 | Status: SHIPPED | OUTPATIENT
Start: 2024-07-18 | End: 2024-07-28

## 2024-07-18 RX ORDER — CEPHALEXIN 500 MG/1
500 CAPSULE ORAL ONCE
Status: COMPLETED | OUTPATIENT
Start: 2024-07-18 | End: 2024-07-18

## 2024-07-18 ASSESSMENT — PAIN SCALES - GENERAL: PAINLEVEL_OUTOF10: 0 - NO PAIN

## 2024-07-18 ASSESSMENT — COLUMBIA-SUICIDE SEVERITY RATING SCALE - C-SSRS
1. IN THE PAST MONTH, HAVE YOU WISHED YOU WERE DEAD OR WISHED YOU COULD GO TO SLEEP AND NOT WAKE UP?: NO
6. HAVE YOU EVER DONE ANYTHING, STARTED TO DO ANYTHING, OR PREPARED TO DO ANYTHING TO END YOUR LIFE?: NO
2. HAVE YOU ACTUALLY HAD ANY THOUGHTS OF KILLING YOURSELF?: NO

## 2024-07-18 NOTE — ED PROVIDER NOTES
HPI   Chief Complaint   Patient presents with    Breast Pain     Pt states she is breast feeding pt states she is having hardness, tenderness to the left breast. Pt states she is concern for mastitis pt states she developed a fever of 101 at home and 100.3 her in the er pt stated she called her OB and OB stated to come  to the er. Pt denies chest pain and sob resp even and unlabored pt is alert and oriented x 4.        HPI  Patient is a 33-year-old female presenting for evaluation of left breast pain that started today.  States that she does have a 6-month-old baby and is currently breast-feeding.  States that the left breast is swollen and very tender around the nipple to touch.  She is concerned she is developing an infection in her nipple.  She also endorses a fever of 101 and has been chilled since 4 PM today.  She called her OB who told her to come to ER for further evaluation.  The patient is very anxious on exam but otherwise no acute distress.      Patient History   Past Medical History:   Diagnosis Date    Abnormal karyotype 06/28/2023    Formatting of this note might be different from the original.   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    46,XX,inv(9)(p12q13) - normal population variant (polymorphism).    Infertility, female 07/03/2023     No past surgical history on file.  No family history on file.  Social History     Tobacco Use    Smoking status: Never    Smokeless tobacco: Never   Vaping Use    Vaping status: Never Used   Substance Use Topics    Alcohol use: Not on file    Drug use: Not on file       Physical Exam   ED Triage Vitals [07/18/24 1857]   Temperature Heart Rate Respirations BP   37.3 °C (99.1 °F) (!) 113 18 125/84      Pulse Ox Temp Source Heart Rate Source Patient Position   100 %  Temporal Monitor Sitting      BP Location FiO2 (%)     Left arm --       Physical Exam  Vitals and nursing note reviewed.   Constitutional:       General: She is not in acute distress.     Appearance: She is well-developed.      Comments: Anxious but well-appearing 33-year-old female resting in exam chair in no acute distress   HENT:      Head: Normocephalic and atraumatic.   Eyes:      Conjunctiva/sclera: Conjunctivae normal.   Cardiovascular:      Rate and Rhythm: Normal rate and regular rhythm.      Heart sounds: No murmur heard.  Pulmonary:      Effort: Pulmonary effort is normal. No respiratory distress.      Breath sounds: Normal breath sounds.   Abdominal:      Palpations: Abdomen is soft.      Tenderness: There is no abdominal tenderness.   Musculoskeletal:         General: No swelling.      Cervical back: Neck supple.   Skin:     General: Skin is warm and dry.      Capillary Refill: Capillary refill takes less than 2 seconds.      Comments: Left breast is without erythema or abscess but is tender around the nipple to the touch and slightly warm and more engorged than the right breast.   Neurological:      Mental Status: She is alert.   Psychiatric:         Mood and Affect: Mood normal.           ED Course & MDM   ED Course as of 07/20/24 0040   Thu Jul 18, 2024 1933 Did discuss the case with the patient's OB gynecologist Dr. Willis.  Will prescribe the patient Keflex and treat as mastitis and he states he will follow-up with her in the office next week.  He is agreeable with this plan of care. [JJ]      ED Course User Index  [JJ] Bailey Blackmon PA-C         Diagnoses as of 07/20/24 0040   Mastitis                       No data recorded                      Medical Decision Making  Parts of this chart have been completed using voice recognition software. Please excuse any errors of transcription.  My thought process and reason for plan has been formulated from the time that I saw the patient until the time  of disposition and is not specific to one specific moment during their visit and furthermore my MDM encompasses this entire chart and not only this text box.      HPI: Detailed above.    Exam: A medically appropriate exam performed, outlined above, given the known history and presentation.    History obtained from: Patient    Social Determinants of Health considered during this visit: Lives independently    Medications given during visit:  Medications   cephalexin (Keflex) capsule 500 mg (500 mg oral Given 7/18/24 1957)        Diagnostic/tests  Labs Reviewed - No data to display   No orders to display        Considerations/further MDM:  Patient is a 33-year-old female presenting for evaluation of left breast pain    Patient anxious but otherwise very well-appearing on exam.  Tachycardic upon arrival to ER but otherwise vital signs are within normal limits.  Based on exam findings with warmth and tenderness around the nipple as well as the patient currently breast-feeding I do have high suspicion for mastitis.  I did discuss the patient case with her OB gynecologist.  He agrees to discharge the patient with oral Keflex for treatment of mastitis and he will follow-up with her in the office.  Therapy for mastitis was discussed with the patient including warm and cool compresses as well as frequent pumping and feeding off the left breast.  Instructed to continue with Tylenol for symptom relief.  She was released in good condition.  I discussed the diagnosis with the patient at bedside. Patient's questions and concerns were addressed. Patient was released in good condition, discharged with instructions to follow up with primary care provider and appropriate specialist, and to return to ED at any time for worsening symptoms or any other concerns. Patient demonstrates understanding of the findings and the importance of appropriate follow up care.         Procedure  Procedures     Bailey Blackmon PA-C  07/20/24 0046

## 2024-07-18 NOTE — DISCHARGE INSTRUCTIONS
Please take Keflex by mouth 4 times daily for 10 days for treatment of your mastitis.  Refer to the attached instructions regarding mastitis.  Symptomatic treatment includes warm compress, hydration, bedrest, ibuprofen and increased frequency of breast-feeding and pumping for relief.  Follow-up with your OB gynecologist within 1 week for further evaluation of your symptoms.    It is important to remember that your care does not end here and you must continue to monitor your condition closely. Please return to the emergency department for any worsening or concerning signs or symptoms as directed by our conversations and the discharge instructions. If you do not have a doctor please contact the referral number on your discharge instructions. Please contact any physician specialists provided in your discharge notes as it is very important to follow up with them regarding your condition. If you are unable to reach the physicians provided, please come back to the Emergency Department at any time.

## 2024-07-19 ENCOUNTER — TELEPHONE (OUTPATIENT)
Dept: OBSTETRICS AND GYNECOLOGY | Facility: HOSPITAL | Age: 34
End: 2024-07-19
Payer: COMMERCIAL

## 2024-07-19 NOTE — PROGRESS NOTES
Ephraim was originally calling over concerns of mastitis. She contacted her OB provider who guided her to go to the ER to be evaluated and prescribed and antibiotic as needed. Reviewed normal breastfeeding signs and symptoms with mastitis. Mother reports feeling better after starting the antibiotics. Mother is also concerned about an oversupply. Reviewed oversupply and ways to help infant and milk supply. Mother is requesting an outpatient appointment to see see milk transfer from infant and develop a plan to manage her oversupply. All questions and concerns addressed. Offered ongoing assistance as needed and desired.

## 2024-07-23 ENCOUNTER — OFFICE VISIT (OUTPATIENT)
Dept: OBSTETRICS AND GYNECOLOGY | Facility: CLINIC | Age: 34
End: 2024-07-23
Payer: COMMERCIAL

## 2024-07-23 VITALS
WEIGHT: 146 LBS | HEIGHT: 61 IN | SYSTOLIC BLOOD PRESSURE: 122 MMHG | BODY MASS INDEX: 27.56 KG/M2 | DIASTOLIC BLOOD PRESSURE: 80 MMHG

## 2024-07-23 DIAGNOSIS — N61.0 MASTITIS IN FEMALE: Primary | ICD-10-CM

## 2024-07-23 PROCEDURE — 3008F BODY MASS INDEX DOCD: CPT | Performed by: OBSTETRICS & GYNECOLOGY

## 2024-07-23 PROCEDURE — 99213 OFFICE O/P EST LOW 20 MIN: CPT | Performed by: OBSTETRICS & GYNECOLOGY

## 2024-07-23 NOTE — PROGRESS NOTES
Subjective   Patient ID: Ephraim Diego is a 33 y.o. female who presents for Breast Problem.  Established patient, delivered early June.  Breast-feeding.  Received a call late last week that she thought she may have mastitis was having some fever and chills.  Went to emergency room.  Started on Keflex.  She is about a 4 5 on 2 Keflex.  No more fever or chills.  On exam there is hyperemia but no obvious redness.  I do not feel any underlying abscesses.    Had concerns about migraines.  Discussed that is likely not from Keflex.  She is also wondering if she is producing less breastmilk or percentages requiring more food.  At this point would recommend hydration finish Keflex continue breast-feeding.  Get some rest    Breast Problem      Review of Systems   All other systems reviewed and are negative.      Objective   Physical Exam  Chest:   Breasts:     Right: Tenderness present.      Left: Tenderness present.      Comments: No underlying masses or significant erythema        Assessment/Plan   Mastitis postpartum.  Continue breast-feeding.  Finish Keflex.  Seeing lactation specialist this Friday         Javier Willis MD 07/23/24 3:24 PM

## 2024-07-26 ENCOUNTER — LACTATION CONSULT (OUTPATIENT)
Dept: LACTATION | Facility: HOSPITAL | Age: 34
End: 2024-07-26
Payer: COMMERCIAL

## 2024-07-26 DIAGNOSIS — O92.70 LACTATION DISORDER (HHS-HCC): Primary | ICD-10-CM

## 2024-07-26 PROCEDURE — 99211 OFF/OP EST MAY X REQ PHY/QHP: CPT | Performed by: OBSTETRICS & GYNECOLOGY

## 2024-07-26 NOTE — PROGRESS NOTES
Lactation Counseling Note    Subjective:    Ephraim Diego is a 33 y.o. patient referred for lactation counseling. She is here today due to  oveersupply and mastitis . She was referred by her  self .     OB HISTORY:   Healthcare Provider: OB/GYN Dr Willis  /Para: : 1  Para: 1  Weeks Gestation: 40  Mode of Delivery: Normal vaginal route    Objective:    BREASTFEEDING ASSESSMENT:   Physical Exam    Baby Name: Patricia Diego  Breast Assessment: Medium, Symmetrical, Soft, Warm, and Compressible  Nipple Assessment/Stage: intact and erect no pain or breakdown  Areola: Normal  Feeding Position: baby - led, breast sandwich, cross - cradle, laid back, both sides, and mother demonstrates good positioning  Latch: instructed on deep latch, areolar attachment, deep latch obtained, sucking and swallowing, flanged lips, and comfortable latch  Suck/Feeding: sustained, audible swallowing, choking/gulping, and content after feeding  Infant Feeding Method: Breast milk only  Infant Behavior: awake, fussy, readiness to feed, rooting response, and content after feeding  Infant Information: Good cupping of tongue  Breast Pain: Pain scale 0-10 (10 most pain): 0  Nipple Pain: Pain scale 0-10 (10 most pain): 0  Weight: Reported pediatrician visit weight: 7 lb 14 oz  Date of pediatrician weight: 7/3/24  Weight before feedin gm  Weight after feedin gm  Amount of breast milk transferred: 145 ml  Number of voids in the last 24 hours: 8  Number of stools in the last 24 hours: 6    PATIENT DISCUSSION SUMMARY:  Mother and infant seen for a follow up appointment to assess milk transfer and address oversupply. Mother just recently treated with mastitis. Mother denies any current pain or discomfort. Mother reports that Patricia is nursing well, comfortably and often. Mother is pumping about three times daily. Infants weight gain is appropriate and infant having adequate voids and stools.     Infant latching well but is showing  signs of mother having an oversupply. Infant quickly transferring over 4 oz and is sputtering and choking at the breast. Reviewed laid back positioning and infant handled milk flow better. Mother pumped after feeding and expressed an addition 70mls. Encouraged mother to slowly start to decrease pumping sessions when she is with infant regularly. Reviewed return to work strategies and pumping while at work. Parents report that infant takes a bottle a day and takes them well.     Parents deny any further questions or concerns. Following up with pediatrician on August 7th and lactation as needed and desired.   LACTATION PROBLEMS AND STRATEGIES:  Hyperlactation: Avoid long periods between feedings  Block feeding -offer same breast for 3 hours  Burp baby frequently and pace feeding  Consult with physician concerning lactation-inhibiting drugs ( Sudafed 60 mg, Benadryl)  Decrease the frequency of feedings  Discuss use of pseudoephedrine with physician  Drink peppermint tea to lower breast milk supply  If breast uncomfortable, express just enough to soften  Lean back or use reclining position during feedings  Massage any plugged milk ducts during feeding  [id number] Breastfeeding: Plugged Milk Ducts/Mastitis given  Place baby in upright position facing your breast  Refer mother to health provider  Use only one breast at each feeding  Use the same breast for several feedings  Utilize Australian hold as a method to slow the flow of milk at the breast through decreasing the gravity factor  Utilize a nipple shield to funnel the milk at the breast so baby can keep up with the flow  Utilize paced feeding allowing the baby to come off the breast to catch her breath as needed  Mastitis: Nurse at least 10-12 times a day. Watch for feeding cues. Pump after feeding  Advil or Tylenol may be taken to reduce fever and to relieve breast pain  Apply warm moist compress to affected area immediately before feeding  Begin nursing on affected  breast  Breastfeed often to keep your breast well drained  Breastfeeding while  you have mastitis will NOT harm your baby  Check for salty taste of milk if baby is refusing one breast  Contact your health care provider immediately to determine whether antibiotics are needed  Gently massage lump during feeding  If prescribed, take entire course of antibiotics  Manually express or pump your breast if necessary to drain the breast  Position baby with chin toward affected area  Remove dried secretions from nipple before feeding  Remove Pressure on breast; tight bra, baby carrier, mom sleeping on stomach, etc  Rest often and increase fluids  Your milk supply in the affected breast may be reduced after mastitis, but will increase with stimulation  PATIENT INSTRUCTION HANDOUTS GIVEN:   Laid back breastfeeding  LACTATION EDUCATION:  Correct Positioning and Correct Latch On

## 2024-11-18 ENCOUNTER — APPOINTMENT (OUTPATIENT)
Dept: LACTATION | Facility: HOSPITAL | Age: 34
End: 2024-11-18
Payer: COMMERCIAL

## 2025-05-28 ENCOUNTER — TELEPHONE (OUTPATIENT)
Dept: OBSTETRICS AND GYNECOLOGY | Facility: CLINIC | Age: 35
End: 2025-05-28
Payer: COMMERCIAL

## 2025-05-28 DIAGNOSIS — Z30.011 OCP (ORAL CONTRACEPTIVE PILLS) INITIATION: ICD-10-CM

## 2025-05-28 RX ORDER — NORETHINDRONE 0.35 MG/1
1 TABLET ORAL DAILY
Qty: 84 TABLET | Refills: 0 | Status: SHIPPED | OUTPATIENT
Start: 2025-05-28 | End: 2026-05-28

## 2025-08-07 ENCOUNTER — APPOINTMENT (OUTPATIENT)
Dept: OBSTETRICS AND GYNECOLOGY | Facility: CLINIC | Age: 35
End: 2025-08-07
Payer: COMMERCIAL

## 2025-08-17 DIAGNOSIS — Z30.011 OCP (ORAL CONTRACEPTIVE PILLS) INITIATION: ICD-10-CM

## 2025-08-18 ENCOUNTER — APPOINTMENT (OUTPATIENT)
Dept: OBSTETRICS AND GYNECOLOGY | Facility: CLINIC | Age: 35
End: 2025-08-18
Payer: COMMERCIAL

## 2025-08-18 VITALS — SYSTOLIC BLOOD PRESSURE: 102 MMHG | DIASTOLIC BLOOD PRESSURE: 60 MMHG

## 2025-08-18 DIAGNOSIS — Z01.419 WELL WOMAN EXAM: Primary | ICD-10-CM

## 2025-08-18 DIAGNOSIS — Z30.011 OCP (ORAL CONTRACEPTIVE PILLS) INITIATION: ICD-10-CM

## 2025-08-18 PROCEDURE — 1036F TOBACCO NON-USER: CPT | Performed by: MIDWIFE

## 2025-08-18 PROCEDURE — 99395 PREV VISIT EST AGE 18-39: CPT | Performed by: MIDWIFE

## 2025-08-18 RX ORDER — NORGESTIMATE AND ETHINYL ESTRADIOL 0.25-0.035
1 KIT ORAL DAILY
Qty: 84 TABLET | Refills: 3 | Status: SHIPPED | OUTPATIENT
Start: 2025-08-18 | End: 2026-08-18

## 2025-08-20 RX ORDER — NORETHINDRONE 0.35 MG/1
TABLET ORAL
Qty: 84 TABLET | Refills: 0 | OUTPATIENT
Start: 2025-08-20